# Patient Record
Sex: FEMALE | Race: WHITE | Employment: FULL TIME | URBAN - METROPOLITAN AREA
[De-identification: names, ages, dates, MRNs, and addresses within clinical notes are randomized per-mention and may not be internally consistent; named-entity substitution may affect disease eponyms.]

---

## 2022-05-03 ENCOUNTER — HOSPITAL ENCOUNTER (OUTPATIENT)
Dept: PHYSICAL THERAPY | Age: 67
Discharge: HOME | End: 2022-05-03
Payer: COMMERCIAL

## 2022-05-03 PROCEDURE — 97110 THERAPEUTIC EXERCISES: CPT

## 2022-05-03 PROCEDURE — 97162 PT EVAL MOD COMPLEX 30 MIN: CPT

## 2022-05-03 NOTE — PROGRESS NOTES
[address] [place] [address] PHYSICAL THERAPY AT [address] [place] [address] [address], [address] ,[address], [address]  Phone: [phone]  Fax: [phone] / [address] [address] PHYSICAL THERAPY SERVICES  Patient Name: Jennifer Macias : 1955   Medical   Diagnosis: Pain in right hip [M25.551] Treatment Diagnosis: R hip pain   Onset Date: 3-4 mos prior to evaluation      Referral Source: Hilda Zavala MD [facility]): 5/3/2022   Prior Hospitalization: See medical history Provider #: [id number]   Prior Level of Function: Pain-free in regards to R hip with driving or working with prolonged periods of standing/walking   Comorbidities: H/o R THR 2016, Clifford Prior TKR 2019, scheduled R TKR 22   Medications: Verified on Patient Summary List   The Plan of Care and following information is based on the information from the initial evaluation.   ==================================================================================  Assessment / key information:  Patient is a pleasant 77 y.o. female who presents to [facility] with R hip pain. Patient reports initial onset of sx 3-4 months ago which were of unknown etiology. Current c/o pain are intermittent & \"sharp\" in nature & worsen with activities such as sitting & driving (particularly with sitting on firmer surfaces are more difficult). Sx improve with standing & ambulation & use of heat & alleve/naproxen prn. Recent X-rays on 22 taken of R hip were unremarkable, no issues with previous hardware from R THR. Average reported pain level at 5/10, 9/10 at worst & 0/10 at best.  Upon objective evaluation, patient demonstrates R knee AROM as follows 8-90 degrees as compared to 2-120 degrees on L. Patient able to achieve ~ 3 degrees from full TKE after passive stretching today. L/s assessment was unremarkable today, mild reproduction of R hip pain with TERESO test today.   Reproduced c/o posterior thigh/knee pain with active knee flexion today. Moderate signs of antalgia with decreased step length on R & circumduction to clear R LE during swing phase of gait. MMT revealed weakness of R hip flexion 3/5 with c/o pain upon MMT. Patient can benefit PT interventions to improve ROM, decrease pain & improve gait mechanics to facilitate return to unlimited ADLs, work activities & overall functional status.   ==================================================================================  Eval Complexity: History HIGH Complexity :3+ comorbidities / personal factors will impact the outcome/ POC ;  Examination  MEDIUM Complexity : 3 Standardized tests and measures addressing body structure, function, activity limitation and / or participation in recreation ; Presentation MEDIUM Complexity : Evolving with changing characteristics ; Decision Making MEDIUM Complexity : FOTO score of 26-74; Overall Complexity MEDIUM  Problem List: pain affecting function, decrease ROM, decrease strength, edema affecting function, impaired gait/ balance, decrease ADL/ functional abilitiies, decrease activity tolerance, decrease flexibility/ joint mobility and decrease transfer abilities   Treatment Plan may include any combination of the following: Therapeutic exercise, Therapeutic activities, Neuromuscular re-education, Physical agent/modality, Gait/balance training, Manual therapy, Aquatic therapy, Patient education, Self Care training, Functional mobility training, Home safety training and Stair training  Patient / Family readiness to learn indicated by: asking questions, trying to perform skills and interest  Persons(s) to be included in education: patient (P)  Barriers to Learning/Limitations: None  Measures taken:    Patient Goal (s): \"relax muscles\"   Patient self reported health status: fair  Rehabilitation Potential: good   Short Term Goals:  To be accomplished in  1  weeks:  1) Establish HEP to prevent further disability. 2) Patient will be able to perform SLR on R without extensor lag indicating improved quad control in preparation for return to a normalized gait. 3) Patient will be able to maintain R SLS  for 10 seconds indicating improved use of balance strategy for safety with ambulation in challenging environments.  Long Term Goals: To be accomplished in  2  weeks:  1) Improve FOTO score from 62 points to > or = 69 points indicating improved tolerance with ADLs in regards to R hip.  2) Improved R hip flexion strength to 3/5 with no reproduction of pain to improve tolerance to sit to stand transfers from her car. 3) Patient will report improved standing tolerance to > or = 30 minutes at work with manageable sx in R LE. 4) Patient to be independent & compliant with HEP in preparation for D/C. Frequency / Duration:   Patient to be seen  2-3  times per week for 2 weeks:  Patient / Caregiver education and instruction: self care, activity modification, brace/ splint application and exercises    Therapist Signature: SIMBA Funes, thomas MDT Date: 9/[birth year]/1966   Certification Period: 5-03-22 to 8-02-22 Time: 12:53 PM   =================================================================================  I certify that the above Physical Therapy Services are being furnished while the patient is under my care. I agree with the treatment plan and certify that this therapy is necessary.     Physician Signature:                                                             Date:                                     Time:                                                                       Hema Narvaez MD

## 2022-05-03 NOTE — PROGRESS NOTES
PHYSICAL THERAPY - DAILY TREATMENT NOTE    Patient Name: Cortez Clark        Date: 5/3/2022  : 1955   YES Patient  Verified  Visit #:   1   of   6  Insurance: Payor: Tomy Guzman / Plan: [address] [place] [address] / Product Type: PPO /      In time: 12:55 P Out time: 1:35 P   Total Treatment Time: 40     BCBS/Medicare Time Tracking (below)   Total Timed Codes (min):  40 1:1 Treatment Time:  40     TREATMENT AREA = Pain in right hip [M25.551]    SUBJECTIVE  Pain Level (on 0 to 10 scale):  3  / 10   Medication Changes/New allergies or changes in medical history, any new surgeries or procedures?     NO    If yes, update Summary List   Subjective Functional Status/Changes:  []  No changes reported     See POC           Modalities Rationale:    Patient deferred     min [] Estim, type/location:                                      []  att     []  unatt     []  w/US     []  w/ice    []  w/heat    min []  Mechanical Traction: type/lbs                   []  pro   []  sup   []  int   []  cont    []  before manual    []  after manual    min []  Ultrasound, settings/location:      min []  Iontophoresis w/ dexamethasone, location:                                               []  take home patch       []  in clinic    min []  Ice     []  Heat    location/position:     min []  Vasopneumatic Device, press/temp:    If using vaso (only need to measure limb vaso being performed on)      pre-treatment girth :       post-treatment girth :       measured at (landmark location) :      min []  Other:    [] Skin assessment post-treatment (if applicable):    []  intact    []  redness- no adverse reaction                  []redness - adverse reaction:        10 min Therapeutic Exercise:  [x]  See flow sheet   Rationale:      increase ROM and increase strength to improve the patients ability to return to pain-free ambulation     Billed With/As:   [] TE   [] TA   [] Neuro   [] Self Care Patient Education: [x] Review HEP    [] Progressed/Changed HEP based on:   [] positioning   [] body mechanics   [] transfers   [] heat/ice application    [] other:      Other Objective/Functional Measures:    See POC     Post Treatment Pain Level (on 0 to 10) scale:   3  / 10     ASSESSMENT  Assessment/Changes in Function:     See POC      []  See Progress Note/Recertification   Patient will continue to benefit from skilled PT services to modify and progress therapeutic interventions, address functional mobility deficits, address ROM deficits, address strength deficits, analyze and address soft tissue restrictions, analyze and cue movement patterns, analyze and modify body mechanics/ergonomics, assess and modify postural abnormalities, address imbalance/dizziness and instruct in home and community integration to attain remaining goals.    Progress toward goals / Updated goals:    Progressing towards goals established at [address] [facility] [address]  []  Upgrade activities as tolerated YES Continue plan of care   []  Discharge due to :    []  Other:      Therapist: Abida Lancaster, PT    Date: 5/3/2022 Time: 2:00 PM     Future Appointments   Date Time Provider Rhett Parra   5/5/2022  8:00 AM Julia Baeza, PT [facility] [place] [facility]   5/10/2022  4:30 PM Julia Baeza PT [facility]   5/12/2022  5:00 PM Julia Baeza, PT [facility]   5/16/2022  9:30 AM Mena Madden, PT [facility]

## 2022-05-05 ENCOUNTER — HOSPITAL ENCOUNTER (OUTPATIENT)
Dept: PHYSICAL THERAPY | Age: 67
Discharge: HOME | End: 2022-05-05
Payer: COMMERCIAL

## 2022-05-05 PROCEDURE — 97530 THERAPEUTIC ACTIVITIES: CPT

## 2022-05-05 PROCEDURE — 97110 THERAPEUTIC EXERCISES: CPT

## 2022-05-05 NOTE — PROGRESS NOTES
PHYSICAL THERAPY - DAILY TREATMENT NOTE    Patient Name: Debra Carrillo        Date: 2022  : 1955   YES Patient  Verified  Visit #:   2   of   6  Insurance: Payor: Hannah Barraza / Plan: [place] MEDICARE PART A & B / Product Type: Medicare /      In time: 8 A Out time: 9:05 A   Total Treatment Time: 55     BCBS/Medicare Time Tracking (below)   Total Timed Codes (min):  45 1:1 Treatment Time:  45     TREATMENT AREA =  Pain in right hip [M25.551]    SUBJECTIVE  Pain Level (on 0 to 10 scale):  2  / 10   Medication Changes/New allergies or changes in medical history, any new surgeries or procedures? NO    If yes, update Summary List   Subjective Functional Status/Changes:  []  No changes reported     Patient reports her hip is not hurting too much this morning.         Modalities Rationale:     decrease edema, decrease inflammation and decrease pain to improve patient's ability to return to pain-free standing at work    min [] Estim, type/location:                                      []  att     []  unatt     []  w/US     []  w/ice    []  w/heat    min []  Mechanical Traction: type/lbs                   []  pro   []  sup   []  int   []  cont    []  before manual    []  after manual    min []  Ultrasound, settings/location:      min []  Iontophoresis w/ dexamethasone, location:                                               []  take home patch       []  in clinic   10 min [x]  Ice     []  Heat    location/position: Supine to R hip     min []  Vasopneumatic Device, press/temp:    If using vaso (only need to measure limb vaso being performed on)      pre-treatment girth :       post-treatment girth :       measured at (landmark location) :      min []  Other:    [] Skin assessment post-treatment (if applicable):    [x]  intact    [x]  redness- no adverse reaction                  _redness - adverse reaction:        30 min Therapeutic Exercise:  [x]  See flow sheet   Rationale:      increase ROM and increase strength to improve the patients ability to return to upright standing without increase in hip pain     15 min Neuromuscular Re-ed: _  See flow sheet   Rationale:    increase ROM, increase strength, improve coordination, improve balance and increase proprioception to improve the patients ability to return to standing/ambulation with upright posture      Billed With/As:   [] TE   [] TA   [] Neuro   [] Self Care Patient Education: [x] Review HEP    [] Progressed/Changed HEP based on:   [] positioning   [] body mechanics   [] transfers   [] heat/ice application    [] other:      Other Objective/Functional Measures:    Initiated exercises per flow sheet (see updated HEP)     Post Treatment Pain Level (on 0 to 10) scale:   1  / 10     ASSESSMENT  Assessment/Changes in Function:     Good tolerance to initial program, limited R knee flexion mild increase in pain with H/L exercises     []  See Progress Note/Recertification   Patient will continue to benefit from skilled PT services to modify and progress therapeutic interventions, address functional mobility deficits, address ROM deficits, address strength deficits, analyze and address soft tissue restrictions, analyze and cue movement patterns, analyze and modify body mechanics/ergonomics, assess and modify postural abnormalities, address imbalance/dizziness and instruct in home and community integration to attain remaining goals.    Progress toward goals / Updated goals:    Progressing towards STG 2 & STG 1 met      PLAN  []  Upgrade activities as tolerated YES Continue plan of care   []  Discharge due to :    []  Other:      Therapist: Aristeo Jiang, PT    Date: 5/5/2022 Time: 12:43 PM     Future Appointments   Date Time Provider Rhett Parra   5/10/2022  4:30 PM Micha Wyatt [facility]   5/12/2022  5:00 PM Davis Chavira, PT [facility]   5/16/2022  9:30 AM Ger Madden, PT [facility]

## 2022-05-10 ENCOUNTER — HOSPITAL ENCOUNTER (OUTPATIENT)
Dept: PHYSICAL THERAPY | Age: 67
Discharge: HOME | End: 2022-05-10
Payer: COMMERCIAL

## 2022-05-10 PROCEDURE — 97110 THERAPEUTIC EXERCISES: CPT

## 2022-05-10 PROCEDURE — 97530 THERAPEUTIC ACTIVITIES: CPT

## 2022-05-10 PROCEDURE — 97112 NEUROMUSCULAR REEDUCATION: CPT

## 2022-05-10 NOTE — PROGRESS NOTES
PHYSICAL THERAPY - DAILY TREATMENT NOTE    Patient Name: Alvin Cooley        Date: 5/10/2022  : 1955   YES Patient  Verified  Visit #:   3   of   6  Insurance: Payor: [employer]  / Plan: [place] MEDICARE PART A & B / Product Type: Medicare /      In time: 4:40 P Out time: 5:30 P   Total Treatment Time: 50     BCBS/Medicare Time Tracking (below)   Total Timed Codes (min):  40 1:1 Treatment Time:  40     TREATMENT AREA =  Pain in right hip [M25.551]  SUBJECTIVE  Pain Level (on 0 to 10 scale):  0  / 10   Medication Changes/New allergies or changes in medical history, any new surgeries or procedures? NO    If yes, update Summary List   Subjective Functional Status/Changes:  []  No changes reported     Patient reports she is feeling good today, she had some soreness in her back after last PT.             Modalities Rationale:     decrease edema, decrease inflammation and decrease pain to improve patient's ability to return to pain-free work activities   min [] Estim, type/location:                                      []  att     []  unatt     []  w/US     []  w/ice    []  w/heat    min []  Mechanical Traction: type/lbs                   []  pro   []  sup   []  int   []  cont    []  before manual    []  after manual    min []  Ultrasound, settings/location:      min []  Iontophoresis w/ dexamethasone, location:                                               []  take home patch       []  in clinic   10 min [x]  Ice     []  Heat    location/position: Supine to R hip     min []  Vasopneumatic Device, press/temp:    If using vaso (only need to measure limb vaso being performed on)      pre-treatment girth :       post-treatment girth :       measured at (landmark location) :      min []  Other:    [] Skin assessment post-treatment (if applicable):    [x]  intact    [x]  redness- no adverse reaction                  []redness - adverse reaction:        20 min Therapeutic Exercise:  [x]  See flow sheet   Rationale: increase ROM and increase strength to improve the patients ability to return to pain-free walking     10 min Neuromuscular Re-ed: [x]  See flow sheet   Rationale:    increase ROM, increase strength, improve balance and increase proprioception to improve the patients ability to return to ambulation with improved gait mechanics    10 min Therapeutic Activity: [x]  See flow sheet   Rationale:    improve coordination, improve balance and increase proprioception to improve the patients ability to return to symptom free squatting     Billed With/As:   [] TE   [] TA   [] Neuro   [] Self Care Patient Education: [x] Review HEP    [] Progressed/Changed HEP based on:   [] positioning   [] body mechanics   [] transfers   [] heat/ice application    [] other:      Other Objective/Functional Measures: Added step ups, SLS & mini squats today     Post Treatment Pain Level (on 0 to 10) scale:   0  / 10     ASSESSMENT  Assessment/Changes in Function:     Visual cues with mirror for = weightbearing with mini squats & to avoid excessive trunk flexion to control depth of squat, reviewed squatting with sit to stand transfers     []  See Progress Note/Recertification   Patient will continue to benefit from skilled PT services to modify and progress therapeutic interventions, address functional mobility deficits, address ROM deficits, address strength deficits, analyze and address soft tissue restrictions, analyze and cue movement patterns, analyze and modify body mechanics/ergonomics, assess and modify postural abnormalities, address imbalance/dizziness and instruct in home and community integration to attain remaining goals.    Progress toward goals / Updated goals:    Progressing towards STG 3 & STG 2 met     PLAN  []  Upgrade activities as tolerated YES Continue plan of care   []  Discharge due to :    []  Other:      Therapist: Fab Barillas PT    Date: 5/10/2022 Time: 6:41 PM     Future Appointments   Date Time Provider Rhett Parra   5/12/2022  5:00 PM Nilsa Quevedo, PT [facility]   5/16/2022  9:30 AM Monica Madden, PT [facility]

## 2022-05-12 ENCOUNTER — HOSPITAL ENCOUNTER (OUTPATIENT)
Dept: PHYSICAL THERAPY | Age: 67
Discharge: HOME | End: 2022-05-12
Payer: COMMERCIAL

## 2022-05-12 PROCEDURE — 97112 NEUROMUSCULAR REEDUCATION: CPT

## 2022-05-12 PROCEDURE — 97530 THERAPEUTIC ACTIVITIES: CPT

## 2022-05-12 PROCEDURE — 97110 THERAPEUTIC EXERCISES: CPT

## 2022-05-12 NOTE — PROGRESS NOTES
PHYSICAL THERAPY - DAILY TREATMENT NOTE    Patient Name: Cortez Clark        Date: 2022  : 1955   YES Patient  Verified  Visit #:   4   of   6  Insurance: Payor: Teofilo Sheikh / Plan: [place] MEDICARE PART A & B / Product Type: Medicare /      In time: 5:05 P Out time: 6:10 P   Total Treatment Time: 55     BCBS/Medicare Time Tracking (below)   Total Timed Codes (min):  45 1:1 Treatment Time:  45     TREATMENT AREA =  Pain in right hip [M25.551]  SUBJECTIVE  Pain Level (on 0 to 10 scale):  3- 10   Medication Changes/New allergies or changes in medical history, any new surgeries or procedures? NO    If yes, update Summary List   Subjective Functional Status/Changes:  []  No changes reported     Patient reports she has just come off of work & she is sore today, she has some pain in her R knee, hip & lower back.            Modalities Rationale:     decrease edema, decrease inflammation and decrease pain to improve patient's ability to return to pain-free work activities   min [] Estim, type/location:                                      []  att     []  unatt     []  w/US     []  w/ice    []  w/heat    min []  Mechanical Traction: type/lbs                   []  pro   []  sup   []  int   []  cont    []  before manual    []  after manual    min []  Ultrasound, settings/location:      min []  Iontophoresis w/ dexamethasone, location:                                               []  take home patch       []  in clinic   10 min [x]  Ice     []  Heat    location/position: Supine to R hip     min []  Vasopneumatic Device, press/temp:    If using vaso (only need to measure limb vaso being performed on)      pre-treatment girth :       post-treatment girth :       measured at (landmark location) :      min []  Other:    [] Skin assessment post-treatment (if applicable):    [x]  intact    [x]  redness- no adverse reaction                  []redness - adverse reaction:        25 min Therapeutic Exercise:  [x] See flow sheet   Rationale:      increase ROM and increase strength to improve the patients ability to return to pain-free standing at work    10 min Neuromuscular Re-ed: [x]  See flow sheet   Rationale:    increase ROM, increase strength, improve balance and increase proprioception to improve the patients ability to return to ambulation with improved gait mechanics with decreased extensor lag    10 min Therapeutic Activity: [x]  See flow sheet   Rationale:    improve coordination, improve balance and increase proprioception to improve the patients ability to return to symptom free lifting at work     Billed With/As:   [] TE   [] TA   [] Neuro   [] Self Care Patient Education: [x] Review HEP    [] Progressed/Changed HEP based on:   [] positioning   [] body mechanics   [] transfers   [] heat/ice application    [] other:      Other Objective/Functional Measures: Added posterior hip stretch in supine     Post Treatment Pain Level (on 0 to 10) scale:   2  / 10     ASSESSMENT  Assessment/Changes in Function:     Decreased stability as well as mild c/o R knee pain with single leg stance today      []  See Progress Note/Recertification   Patient will continue to benefit from skilled PT services to modify and progress therapeutic interventions, address functional mobility deficits, address ROM deficits, address strength deficits, analyze and address soft tissue restrictions, analyze and cue movement patterns, analyze and modify body mechanics/ergonomics, assess and modify postural abnormalities, address imbalance/dizziness and instruct in home and community integration to attain remaining goals.    Progress toward goals / Updated goals:    Progressing towards STG 3     PLAN  []  Upgrade activities as tolerated YES Continue plan of care   []  Discharge due to :    []  Other:      Therapist: Joesph Fernandes PT    Date: 5/12/2022 Time: 6:45 PM     Future Appointments   Date Time Provider Rhett Parra   5/16/2022 9:30 AM Km Madden Dk, PT [facility]

## 2022-05-16 ENCOUNTER — HOSPITAL ENCOUNTER (OUTPATIENT)
Dept: PHYSICAL THERAPY | Age: 67
Discharge: HOME | End: 2022-05-16
Payer: COMMERCIAL

## 2022-05-16 PROCEDURE — 97530 THERAPEUTIC ACTIVITIES: CPT

## 2022-05-16 PROCEDURE — 97110 THERAPEUTIC EXERCISES: CPT

## 2022-05-16 NOTE — PROGRESS NOTES
PHYSICAL THERAPY - DAILY TREATMENT NOTE    Patient Name: Kalpana Cutler        Date: 2022  : 1955   YES Patient  Verified  Visit #:   5   of   5  Insurance: Payor: Jeanette Lax / Plan: [place] MEDICARE PART A & B / Product Type: Medicare /      In time: 9:40 A Out time: 10:45 A   Total Treatment Time: 55     BCBS/Medicare Time Tracking (below)   Total Timed Codes (min):  45 1:1 Treatment Time:  45     TREATMENT AREA =  Pain in right hip [M25.551]  SUBJECTIVE  Pain Level (on 0 to 10 scale):  0.5  / 10   Medication Changes/New allergies or changes in medical history, any new surgeries or procedures?     NO    If yes, update Summary List   Subjective Functional Status/Changes:  []  No changes reported     See DC summary            Modalities Rationale:     decrease edema, decrease inflammation and decrease pain to improve patient's ability to return to pain-free ADLs    min [] Estim, type/location:                                      []  att     []  unatt     []  w/US     []  w/ice    []  w/heat    min []  Mechanical Traction: type/lbs                   []  pro   []  sup   []  int   []  cont    []  before manual    []  after manual    min []  Ultrasound, settings/location:      min []  Iontophoresis w/ dexamethasone, location:                                               []  take home patch       []  in clinic   10 min [x]  Ice     []  Heat    location/position: Supine to R hip/knee    min []  Vasopneumatic Device, press/temp:    If using vaso (only need to measure limb vaso being performed on)      pre-treatment girth :       post-treatment girth :       measured at (landmark location) :      min []  Other:    [] Skin assessment post-treatment (if applicable):    [x]  intact    [x]  redness- no adverse reaction                  []redness - adverse reaction:        30 min Therapeutic Exercise:  [x]  See flow sheet   Rationale:      increase ROM and increase strength to improve the patients ability to return to pain-free standing     15 min Therapeutic Activity: [x]  See flow sheet   Rationale:    increase ROM, increase strength, improve balance and increase proprioception to improve the patients ability to return to pain-free walking program     Billed With/As:   [] TE   [] TA   [] Neuro   [] Self Care Patient Education: [x] Review HEP    [] Progressed/Changed HEP based on:   [] positioning   [] body mechanics   [] transfers   [] heat/ice application    [] other:      Other Objective/Functional Measures:    FOTO 64 points  MMT: R hip flexion 4/4  R knee AROM 3-85 degrees      Post Treatment Pain Level (on 0 to 10) scale:   0  / 10     ASSESSMENT  Assessment/Changes in Function:     Patient is pleased with her progress & feels ready for DC to [facility]      []  See Progress Note/Recertification   Patient will continue to benefit from skilled PT services to instruct in home and community integration to attain remaining goals. Progress toward goals / Updated goals:    See DC summary for progress with goals      PLAN  []  Upgrade activities as tolerated NO Continue plan of care   []  Discharge due to :    []  Other:      Therapist: Dafen Ellington, PT    Date: 5/16/2022 Time: 9:40 AM     No future appointments.

## 2022-05-16 NOTE — PROGRESS NOTES
[address] [place] [address] PHYSICAL THERAPY AT [address] [place], [place] [address]fallon [address], [address] ,[address], [address]  Phone: [phone]  Fax: [phone] FOR PHYSICAL THERAPY          Patient Name: Mac Jones : 1955   Treatment/Medical Diagnosis: Pain in right hip [M25.551]   Onset Date: 3-4 mos prior to evaluation     Referral Source: Ayana Carty MD [facility]): 22   Prior Hospitalization: See Medical History Provider #: [id number]   Prior Level of Function: Pain-free in regards to R hip with driving or working with prolonged periods of standing/walking   Comorbidities: H/o R THR 2016, Jennifer Sachs TKR 2019, scheduled R TKR 22   Medications: Verified on Patient Summary List   [facility] 5 Missed Visits: 0     Goal/Measure of Progress Goal Met? 1. Patient will be able to perform SLR on R without extensor lag indicating improved quad control in preparation for return to a normalized gait. Status at last Eval: Extensor lag Current Status: No extensor lag with SLR (pt able to maintain quad set within available ROM) yes   2 Patient will be able to maintain R SLS  for 10 seconds indicating improved use of balance strategy for safety with ambulation in challenging environments. Status at last Eval: unable Current Status: 10 secs yes   3. Improved R hip flexion strength to 3/5 with no reproduction of pain to improve tolerance to sit to stand transfers from her car. Status at last Eval: 3/5 c/o pain upon MMT Current Status: 4/5  yes   4. Patient will report improved standing tolerance to > or = 30 minutes at work with manageable sx in R LE. Status at last Eval: Limited by pain Current Status: 1 hour yes     Key Functional Changes/Progress: Mrs. Marita Chi has made good progress with reduction of R hip pain, while extension of R knee has improved, lacking 2-3 degrees from TKE, R knee flexion continues to be limited at 85 degrees.  She has reported improved tolerance to prolonged standing & ambulation at work. She has improved gait mechanics with improved R knee extension prior to initial contact. She is scheduled for TKR on 5-17-22. She is pleased with her progress & is in agreement with DC. Assessments/Recommendations: Discontinue therapy. Progressing towards or have reached established goals. If you have any questions/comments please contact us directly at [phone]. Thank you for allowing us to assist in the care of your patient.     Therapist Signature: SIMBA Fang, cert MDT Date: 3-81-57   Reporting Period: 5-03-22 to  Time: 10:22 AM

## 2022-06-23 ENCOUNTER — HOSPITAL ENCOUNTER (OUTPATIENT)
Dept: PHYSICAL THERAPY | Age: 67
Discharge: HOME | End: 2022-06-23
Payer: COMMERCIAL

## 2022-06-23 PROCEDURE — 97110 THERAPEUTIC EXERCISES: CPT

## 2022-06-23 PROCEDURE — 97162 PT EVAL MOD COMPLEX 30 MIN: CPT

## 2022-06-23 NOTE — PROGRESS NOTES
PHYSICAL THERAPY - DAILY TREATMENT NOTE    Patient Name: Yessenia Robison        Date: 2022  : 1955   YES Patient  Verified  Visit #:     Insurance: Payor: Karolina Rey / Plan: [place] MEDICARE PART A & B / Product Type: Medicare /      In time: 4:25 P Out time: 5:10 P   Total Treatment Time: 45     BCBS/Medicare Time Tracking (below)   Total Timed Codes (min):  35 1:1 Treatment Time:  35     TREATMENT AREA =  Pain in right knee [M25.561]  SUBJECTIVE  Pain Level (on 0 to 10 scale):  0  / 10   Medication Changes/New allergies or changes in medical history, any new surgeries or procedures?     NO    If yes, update Summary List   Subjective Functional Status/Changes:  []  No changes reported     See POC            Modalities Rationale:     decrease edema, decrease inflammation and decrease pain to improve patient's ability to return to pain-free ADLs    min [] Estim, type/location:                                      []  att     []  unatt     []  w/US     []  w/ice    []  w/heat    min []  Mechanical Traction: type/lbs                   []  pro   []  sup   []  int   []  cont    []  before manual    []  after manual    min []  Ultrasound, settings/location:      min []  Iontophoresis w/ dexamethasone, location:                                               []  take home patch       []  in clinic   10 min [x]  Ice     []  Heat    location/position: Supine to R knee     min []  Vasopneumatic Device, press/temp:    If using vaso (only need to measure limb vaso being performed on)      pre-treatment girth :       post-treatment girth :       measured at (landmark location) :      min []  Other:    [] Skin assessment post-treatment (if applicable):    [x]  intact    [x]  redness- no adverse reaction                  []redness - adverse reaction:        10 min Therapeutic Exercise:  [x]  See flow sheet   Rationale:      increase ROM and increase strength to improve the patients ability to return to pain-free walking program     Billed With/As:   [] TE   [] TA   [] Neuro   [] Self Care Patient Education: [x] Review HEP    [] Progressed/Changed HEP based on:   [] positioning   [] body mechanics   [] transfers   [] heat/ice application    [] other:      Other Objective/Functional Measures:    See POC     Post Treatment Pain Level (on 0 to 10) scale:   0  / 10     ASSESSMENT  Assessment/Changes in Function:     See POC      []  See Progress Note/Recertification   Patient will continue to benefit from skilled PT services to modify and progress therapeutic interventions, address functional mobility deficits, address ROM deficits, address strength deficits, analyze and address soft tissue restrictions, analyze and cue movement patterns, analyze and modify body mechanics/ergonomics, assess and modify postural abnormalities, address imbalance/dizziness and instruct in home and community integration to attain remaining goals.    Progress toward goals / Updated goals:    Progressing towards goal established at [address]  []  Upgrade activities as tolerated YES Continue plan of care   []  Discharge due to :    []  Other:      Therapist: Pretty Aguilar, PT    Date: 6/23/2022 Time: 4:57 PM     Future Appointments   Date Time Provider Rhett Parra   6/27/2022  7:15 AM Latha Madden, PT [facility]

## 2022-06-23 NOTE — PROGRESS NOTES
[address] [place] [address] PHYSICAL THERAPY AT [address] [place] [address]  [address], [address] ,[address], [address]  Phone: [phone]  Fax: [phone] / [address] [address] PHYSICAL THERAPY SERVICES  Patient Name: Manny Schulz : 1955   Medical   Diagnosis: Pain in right knee [M25.561] Treatment Diagnosis: S/p R TKR    Onset Date: 22     Referral Source: Erick Matthews MD Start of Care [facility]): 2022   Prior Hospitalization: See medical history Provider #: [id number]   Prior Level of Function: C/o R hip pain & limited with standing/walking with ADLs & work   Comorbidities: H/o R THR, L TKR, HTN, depression   Medications: Verified on Patient Summary List   The Plan of Care and following information is based on the information from the initial evaluation.   ==================================================================================  Assessment / key information:  Patient is a pleasant 77 y.o. female who presents to [facility] s/p R TKR (DOS 22). Patient reports she stayed at the hospital for 1 night after surgery & was DC from home health PT on 22. She DC use of RW & SPC ~3 weeks ago. Current c/o pain are intermittent in nature & worsen with activities such as prolonged upright activities & as the day progresses. Sx improve with use of tylenol prn, she is on her 3rd day of a steroid taper that she was prescribed this past follow up with MD.  Average reported pain level at 0.5/10, 2/10 at worst & 0/10 at best.  Upon objective evaluation, patient demonstrates R knee AROM as follows 7-103 degrees as compared to 2-125 degrees in supine. MMT revealed  Overall R knee strength at 4/5 with minimal to no c/o pain upon MMT. She is currently ambulating without AD, extensor lag on R & decreased step length. Moderate warm to palpation, mild signs of swelling, scar is intact & appears to be healing well.    Patient can benefit PT interventions to improve ROM, decrease pain & swelling & normalize gait to facilitate return to unlimited ADLs, work activities & overall functional status.   ==================================================================================  Eval Complexity: History HIGH Complexity :3+ comorbidities / personal factors will impact the outcome/ POC ;  Examination  MEDIUM Complexity : 3 Standardized tests and measures addressing body structure, function, activity limitation and / or participation in recreation ; Presentation MEDIUM Complexity : Evolving with changing characteristics ; Decision Making MEDIUM Complexity : FOTO score of 26-74; Overall Complexity MEDIUM  Problem List: pain affecting function, decrease ROM, decrease strength, edema affecting function, impaired gait/ balance, decrease ADL/ functional abilitiies, decrease activity tolerance, decrease flexibility/ joint mobility and decrease transfer abilities   Treatment Plan may include any combination of the following: Therapeutic exercise, Therapeutic activities, Neuromuscular re-education, Physical agent/modality, Gait/balance training, Manual therapy, Aquatic therapy, Patient education, Self Care training, Functional mobility training, Home safety training and Stair training  Patient / Family readiness to learn indicated by: asking questions, trying to perform skills and interest  Persons(s) to be included in education: patient (P)  Barriers to Learning/Limitations: None  Measures taken:    Patient Goal (s): \"increase ROM, strengthen muscles\"   Patient self reported health status: fair  Rehabilitation Potential: good   Short Term Goals: To be accomplished in  2  weeks:  1) Establish HEP to prevent further disability. 2) Patient will report decreased c/o pain to < or = 0-1/10 at worst to facilitate sleeping at night uninterrupted with manageable sx in R knee.   3) Improve FOTO score from 58 points to > or = 60 points indicating improved tolerance with ADLs in regards to R knee. 4) Patient will improve R knee AROM to 2-110 degrees so ROM is available for dressing activities.  Long Term Goals: To be accomplished in  4  weeks:  1) Improve FOTO score from 60 points to > or = 63 points indicating improved tolerance with ADLs in regards to R knee. 2) Improve overall strength of R knee to 5-/5 with no c/o pain upon MMT so strength is available for return to work at full duty. 3) Patient to be able to perform FWD step down from 4-6 inch step with good pelvis/knee stability & no c/o pain in preparation for return to rec stair negotiation. 4) Patient will ambulate with a normalized gait pattern without assistive device with no signs of extensor lag on R. Frequency / Duration:   Patient to be seen  2-3  times per week for 4  weeks:  Patient / Caregiver education and instruction: self care, activity modification, brace/ splint application and exercises    Therapist Signature: SIMBA Desouza, thomas MDT Date: 3/36/9651   Certification Period: 6-23-22 to 9-21-22 Time: 4:30 PM   =================================================================================  I certify that the above Physical Therapy Services are being furnished while the patient is under my care. I agree with the treatment plan and certify that this therapy is necessary.     Physician Signature:                                                             Date:                                     Time:                                                                       Ken Allred MD

## 2022-06-27 ENCOUNTER — HOSPITAL ENCOUNTER (OUTPATIENT)
Dept: PHYSICAL THERAPY | Age: 67
Discharge: HOME | End: 2022-06-27
Payer: COMMERCIAL

## 2022-06-27 PROCEDURE — 97530 THERAPEUTIC ACTIVITIES: CPT

## 2022-06-27 PROCEDURE — 97110 THERAPEUTIC EXERCISES: CPT

## 2022-06-27 NOTE — PROGRESS NOTES
PHYSICAL THERAPY - DAILY TREATMENT NOTE    Patient Name: Paulette Pete        Date: 2022  : 1955   YES Patient  Verified  Visit #:   2   of   12  Insurance: Payor: Charlotte Martinez / Plan: VA MEDICARE PART A & B / Product Type: Medicare /      In time: 7:20 A Out time: 8:20 A   Total Treatment Time: 55     BCBS/Medicare Time Tracking (below)   Total Timed Codes (min):  45 1:1 Treatment Time:  40     TREATMENT AREA =  Pain in right knee [M25.561]  SUBJECTIVE  Pain Level (on 0 to 10 scale):  0  / 10   Medication Changes/New allergies or changes in medical history, any new surgeries or procedures? NO    If yes, update Summary List   Subjective Functional Status/Changes:  []  No changes reported     Patient reports she has stiffness in her knee, some pain on the back of her thigh.             Modalities Rationale:     decrease edema, decrease inflammation and decrease pain to improve patient's ability to return to pain-free ADLs    min [] Estim, type/location:                                      []  att     []  unatt     []  w/US     []  w/ice    []  w/heat    min []  Mechanical Traction: type/lbs                   []  pro   []  sup   []  int   []  cont    []  before manual    []  after manual    min []  Ultrasound, settings/location:      min []  Iontophoresis w/ dexamethasone, location:                                               []  take home patch       []  in clinic   10 min [x]  Ice     []  Heat    location/position: Supine to R knee     min []  Vasopneumatic Device, press/temp:    If using vaso (only need to measure limb vaso being performed on)      pre-treatment girth :       post-treatment girth :       measured at (landmark location) :      min []  Other:    [] Skin assessment post-treatment (if applicable):    [x]  intact    [x]  redness- no adverse reaction                  []redness - adverse reaction:        30/  25 min Therapeutic Exercise:  [x]  See flow sheet   Rationale: increase ROM, increase strength, improve balance and increase proprioception to improve the patients ability to return to ambulation with decreased signed antalgia      15 min Therapeutic Activity: [x]  See flow sheet   Rationale:    increase ROM, increase strength, improve balance and increase proprioception to improve the patients ability to return to pain-free standing      Billed With/As:   [] TE   [] TA   [] Neuro   [] Self Care Patient Education: [x] Review HEP    [] Progressed/Changed HEP based on:   [] positioning   [] body mechanics   [] transfers   [] heat/ice application    [] other:      Other Objective/Functional Measures:    Initiated exercises per flow sheet (see updated HEP)     Post Treatment Pain Level (on 0 to 10) scale:   1  / 10     ASSESSMENT  Assessment/Changes in Function:     Good tolerance to initial treatment, mild posterior knee pain with SLR as well as flexion self stretches     []  See Progress Note/Recertification   Patient will continue to benefit from skilled PT services to modify and progress therapeutic interventions, address functional mobility deficits, address ROM deficits, address strength deficits, analyze and address soft tissue restrictions, analyze and cue movement patterns, analyze and modify body mechanics/ergonomics, assess and modify postural abnormalities, address imbalance/dizziness and instruct in home and community integration to attain remaining goals.    Progress toward goals / Updated goals:    Progressing towards goals STG & STG 1 met      PLAN  []  Upgrade activities as tolerated YES Continue plan of care   []  Discharge due to :    []  Other:      Therapist: Olivia Jacinto PT    Date: 6/27/2022 Time: 7:45 AM     Future Appointments   Date Time Provider Rhett Parra   6/30/2022  7:15 AM Rosendo Clubs, PT [facility]   7/5/2022  7:15 AM Rosendo Clubs, PT [facility]   7/8/2022  7:15 AM Rosendo Clubs, PT [facility]   7/11/2022  7:15 AM Charlie Lupe [facility]   7/15/2022  7:15 AM Ivy Maguire, PT [facility]   7/18/2022  7:15 AM Ivy Maguire, PT [facility]   7/22/2022  7:15 AM vIy Maguire, PT [facility]   7/25/2022  7:15 AM Ivy Maguire, PT [facility]   7/29/2022  7:15 AM Sohan Madden, PT [facility]

## 2022-06-30 ENCOUNTER — HOSPITAL ENCOUNTER (OUTPATIENT)
Dept: PHYSICAL THERAPY | Age: 67
Discharge: HOME | End: 2022-06-30
Payer: COMMERCIAL

## 2022-06-30 PROCEDURE — 97530 THERAPEUTIC ACTIVITIES: CPT

## 2022-06-30 PROCEDURE — 97110 THERAPEUTIC EXERCISES: CPT

## 2022-06-30 PROCEDURE — 97112 NEUROMUSCULAR REEDUCATION: CPT

## 2022-06-30 NOTE — PROGRESS NOTES
PHYSICAL THERAPY - DAILY TREATMENT NOTE    Patient Name: Nadeem Rosa        Date: 2022  : 1955   YES Patient  Verified  Visit #:   3   of   12  Insurance: Payor: Gerry Seeds / Plan: VA MEDICARE PART A & B / Product Type: Medicare /      In time: 7:15 A Out time: 8:15 A   Total Treatment Time: 55     BCBS/Medicare Time Tracking (below)   Total Timed Codes (min):  45 1:1 Treatment Time:  45     TREATMENT AREA =  Pain in right knee [M25.561]  SUBJECTIVE  Pain Level (on 0 to 10 scale):    / 10   Medication Changes/New allergies or changes in medical history, any new surgeries or procedures? NO    If yes, update Summary List   Subjective Functional Status/Changes:  []  No changes reported     Patient reports she was sore after PT & the day after she took an OTC med to manage her pain. She was able to get around all the way on her recumbent bike at home after a few tries.            Modalities Rationale:     decrease edema, decrease inflammation and decrease pain to improve patient's ability to return to pain-free ADLs    min [] Estim, type/location:                                      []  att     []  unatt     []  w/US     []  w/ice    []  w/heat    min []  Mechanical Traction: type/lbs                   []  pro   []  sup   []  int   []  cont    []  before manual    []  after manual    min []  Ultrasound, settings/location:      min []  Iontophoresis w/ dexamethasone, location:                                               []  take home patch       []  in clinic   10 min [x]  Ice     []  Heat    location/position: Supine to R knee     min []  Vasopneumatic Device, press/temp:    If using vaso (only need to measure limb vaso being performed on)      pre-treatment girth :       post-treatment girth :       measured at (landmark location) :      min []  Other:    [] Skin assessment post-treatment (if applicable):    [x]  intact    [x]  redness- no adverse reaction                  []redness - adverse reaction:        15 min Therapeutic Exercise:  [x]  See flow sheet   Rationale:      increase ROM and increase strength to improve the patients ability to return to pain-free standing     15 min Therapeutic Activity: [x]  See flow sheet   Rationale:    increase ROM, increase strength, improve balance and increase proprioception to improve the patients ability to return to pain-free stair negotiation     15 min Neuromuscular Re-ed: [x]  See flow sheet   Rationale:    increase ROM, increase strength, improve coordination, improve balance and increase proprioception to improve the patients ability to improve gait mechanics with decreased extensor lag      Billed With/As:   [] TE   [] TA   [] Neuro   [] Self Care Patient Education: [x] Review HEP    [] Progressed/Changed HEP based on:   [] positioning   [] body mechanics   [] transfers   [] heat/ice application    [] other:      Other Objective/Functional Measures: Added step up on 4 inch step, performed bike at end of treatment prior to heel slides in supine      Post Treatment Pain Level (on 0 to 10) scale:   4  / 10     ASSESSMENT  Assessment/Changes in Function:     Good tolerance to today's treatment improved TKE after initial stretching      []  See Progress Note/Recertification   Patient will continue to benefit from skilled PT services to modify and progress therapeutic interventions, address functional mobility deficits, address ROM deficits, address strength deficits, analyze and address soft tissue restrictions, analyze and cue movement patterns, analyze and modify body mechanics/ergonomics, assess and modify postural abnormalities, address imbalance/dizziness and instruct in home and community integration to attain remaining goals.    Progress toward goals / Updated goals:    Progressing towards STG 2 & 4     PLAN  []  Upgrade activities as tolerated YES Continue plan of care   []  Discharge due to :    []  Other:      Therapist: Jen Holland REYMUNDO, PT    Date: 6/30/2022 Time: 7:35 AM     Future Appointments   Date Time Provider Rhett Mcdonnelli   7/5/2022  7:15 AM Fernando Greenbrier, PT [facility] [place] [address]   7/8/2022  7:15 AM Fernando Greenbrier, PT [facility] [address]   7/11/2022  7:15 AM Fernando Greenbrier, PT [facility] [address]   7/15/2022  7:15 AM Fernando Greenbrier, PT [facility] [address]   7/18/2022  7:15 AM Fernando Greenbrier, PT [facility] [address]   7/22/2022  7:15 AM Fernando Greenbrier, PT [facility] [address]   7/25/2022  7:15 AM Fernando Greenbrier, PT [facility] [address]   7/29/2022  7:15 AM Akash Madden, PT [facility] [address]

## 2022-07-05 ENCOUNTER — HOSPITAL ENCOUNTER (OUTPATIENT)
Dept: PHYSICAL THERAPY | Age: 67
Discharge: HOME | End: 2022-07-05
Payer: COMMERCIAL

## 2022-07-05 PROCEDURE — 97110 THERAPEUTIC EXERCISES: CPT

## 2022-07-05 PROCEDURE — 97112 NEUROMUSCULAR REEDUCATION: CPT

## 2022-07-05 PROCEDURE — 97530 THERAPEUTIC ACTIVITIES: CPT

## 2022-07-05 NOTE — PROGRESS NOTES
PHYSICAL THERAPY - DAILY TREATMENT NOTE    Patient Name: Juanita Nguyen        Date: 2022  : 1955   YES Patient  Verified  Visit #:      12  Insurance: Payor: Estefani Racer / Plan: [place] MEDICARE PART A & B / Product Type: Medicare /      In time: 7:15 A Out time: 8:15 A   Total Treatment Time: 55     BCBS/Medicare Time Tracking (below)   Total Timed Codes (min):  45 1:1 Treatment Time:  45     TREATMENT AREA =  Pain in right knee [M25.561]  SUBJECTIVE  Pain Level (on 0 to 10 scale):  1  / 10   Medication Changes/New allergies or changes in medical history, any new surgeries or procedures? NO    If yes, update Summary List   Subjective Functional Status/Changes:  []  No changes reported     Patient reports did better after last PT visit, not as sore. She was able to get all the way around on her bike at home. She still has some pain in her R hip at times.            Modalities Rationale:     decrease edema, decrease inflammation and decrease pain to improve patient's ability to return to pain-free ADLs    min [] Estim, type/location:                                      []  att     []  unatt     []  w/US     []  w/ice    []  w/heat    min []  Mechanical Traction: type/lbs                   []  pro   []  sup   []  int   []  cont    []  before manual    []  after manual    min []  Ultrasound, settings/location:      min []  Iontophoresis w/ dexamethasone, location:                                               []  take home patch       []  in clinic   10 min [x]  Ice     []  Heat    location/position: Supine to R knee     min []  Vasopneumatic Device, press/temp:    If using vaso (only need to measure limb vaso being performed on)      pre-treatment girth :       post-treatment girth :       measured at (landmark location) :      min []  Other:    [] Skin assessment post-treatment (if applicable):    [x]  intact    [x]  redness- no adverse reaction                  []redness - adverse reaction: 15 min Therapeutic Exercise:  [x]  See flow sheet   Rationale:      increase ROM and increase strength to improve the patients ability to return to pain-free walking program     15 min Therapeutic Activity: [x]  See flow sheet   Rationale:    increase ROM, increase strength, improve balance and increase proprioception to improve the patients ability to return to pain-free squatting     15 min Neuromuscular Re-ed: [x]  See flow sheet   Rationale:    increase ROM, increase strength, improve coordination, improve balance and increase proprioception to improve the patients ability to improve gait mechanics with decreased extensor lag      Billed With/As:   [] TE   [] TA   [] Neuro   [] Self Care Patient Education: [x] Review HEP    [] Progressed/Changed HEP based on:   [] positioning   [] body mechanics   [] transfers   [] heat/ice application    [] other:      Other Objective/Functional Measures: Added prone hip extension over pillow     Post Treatment Pain Level (on 0 to 10) scale:  1/ 10     ASSESSMENT  Assessment/Changes in Function:     Good tolerance to today's treatment improved TKE after initial stretching      []  See Progress Note/Recertification   Patient will continue to benefit from skilled PT services to modify and progress therapeutic interventions, address functional mobility deficits, address ROM deficits, address strength deficits, analyze and address soft tissue restrictions, analyze and cue movement patterns, analyze and modify body mechanics/ergonomics, assess and modify postural abnormalities, address imbalance/dizziness and instruct in home and community integration to attain remaining goals.    Progress toward goals / Updated goals:    Progressing towards STG 2 & LTG 4     PLAN  []  Upgrade activities as tolerated YES Continue plan of care   []  Discharge due to :    []  Other:      Therapist: Mike Hampton PT    Date: 7/5/2022 Time: 7:35 AM     Future Appointments   Date Time Provider Rhett Parra   7/8/2022  7:15 AM Pamela Mater, PT [facility]   7/11/2022  7:15 AM Pamela Mater, PT [facility]   7/15/2022  7:15 AM Pamela Mater, PT [facility]   7/18/2022  7:15 AM Pamela Mater, PT [facility]   7/22/2022  7:15 AM Pamela Mater, PT [facility]   7/25/2022  7:15 AM Pamela Mater, PT [facility]   7/29/2022  7:15 AM Iftikhar Madden, PT [facility]

## 2022-07-08 ENCOUNTER — HOSPITAL ENCOUNTER (OUTPATIENT)
Dept: PHYSICAL THERAPY | Age: 67
Discharge: HOME | End: 2022-07-08
Payer: COMMERCIAL

## 2022-07-08 PROCEDURE — 97112 NEUROMUSCULAR REEDUCATION: CPT

## 2022-07-08 PROCEDURE — 97110 THERAPEUTIC EXERCISES: CPT

## 2022-07-08 PROCEDURE — 97530 THERAPEUTIC ACTIVITIES: CPT

## 2022-07-08 NOTE — PROGRESS NOTES
PHYSICAL THERAPY - DAILY TREATMENT NOTE    Patient Name: Lizbet Willis        Date: 2022  : 1955   YES Patient  Verified  Visit #:      of   12  Insurance: Payor: Ya Yang / Plan: [place] MEDICARE PART A & B / Product Type: Medicare /      In time: 7:20 A Out time: 8:20 A   Total Treatment Time: 55     BCBS/Medicare Time Tracking (below)   Total Timed Codes (min):  45 1:1 Treatment Time:  45     TREATMENT AREA =  Pain in right knee [M25.561]  SUBJECTIVE  Pain Level (on 0 to 10 scale):  1  / 10   Medication Changes/New allergies or changes in medical history, any new surgeries or procedures? NO    If yes, update Summary List   Subjective Functional Status/Changes:  []  No changes reported     Patient reports she is still having pain on the outside of her R hip when she rolls over in bed & or when she gets up after sitting for a long time.            Modalities Rationale:     decrease edema, decrease inflammation and decrease pain to improve patient's ability to return to pain-free ADLs    min [] Estim, type/location:                                      []  att     []  unatt     []  w/US     []  w/ice    []  w/heat    min []  Mechanical Traction: type/lbs                   []  pro   []  sup   []  int   []  cont    []  before manual    []  after manual    min []  Ultrasound, settings/location:      min []  Iontophoresis w/ dexamethasone, location:                                               []  take home patch       []  in clinic   10 min [x]  Ice     []  Heat    location/position: Supine to R knee     min []  Vasopneumatic Device, press/temp:    If using vaso (only need to measure limb vaso being performed on)      pre-treatment girth :       post-treatment girth :       measured at (landmark location) :      min []  Other:    [] Skin assessment post-treatment (if applicable):    [x]  intact    [x]  redness- no adverse reaction                  []redness - adverse reaction:        15 min Therapeutic Exercise:  [x]  See flow sheet   Rationale:      increase ROM and increase strength to improve the patients ability to return to pain-free walking program      15 min Therapeutic Activity: [x]  See flow sheet   Rationale:    increase ROM, increase strength, improve coordination and increase proprioception to improve the patients ability to return to pain-free squatting    15 min Neuromuscular Re-ed: [x]  See flow sheet   Rationale:    improve coordination, improve balance and increase proprioception to improve the patients ability to return to ADLs with decreased fall risk       Billed With/As:   [] TE   [] TA   [] Neuro   [] Self Care Patient Education: [x] Review HEP    [] Progressed/Changed HEP based on:   [] positioning   [] body mechanics   [] transfers   [] heat/ice application    [] other:      Other Objective/Functional Measures:    Modified all exercises in longsitting (see flow sheet), held S/L exercises, added supine hip ER stretch     Post Treatment Pain Level (on 0 to 10) scale:   1  / 10     ASSESSMENT  Assessment/Changes in Function:     Moderate c/o pain with rolling from supine to S/L to prone despite use of pillow between knees, c/o pain into R lateral hip, mild c/o groin pain with supine ER stretch although did not reproduce lateral hip pain     []  See Progress Note/Recertification   Patient will continue to benefit from skilled PT services to modify and progress therapeutic interventions, address functional mobility deficits, address ROM deficits, address strength deficits, analyze and address soft tissue restrictions, analyze and cue movement patterns, analyze and modify body mechanics/ergonomics, assess and modify postural abnormalities, address imbalance/dizziness and instruct in home and community integration to attain remaining goals.    Progress toward goals / Updated goals:    Progressing towards STG 2 & 3     PLAN  []  Upgrade activities as tolerated YES Continue plan of care   []  Discharge due to :    []  Other:      Therapist: Tigre Lerma PT    Date: 7/8/2022 Time: 8:29 AM     Future Appointments   Date Time Provider Rhett Parra   7/11/2022  7:15 AM Ted Jeff Davis, PT [facility]   7/15/2022  7:15 AM [facility], PT [facility]   7/18/2022  7[facility]   7/22/2022  7[facility]   7/25/2022  7:15 AM Ted Jeff Davis, PT [facility]   7/29/2022  7:15 AM Talita Madden, PT [facility]

## 2022-07-11 ENCOUNTER — HOSPITAL ENCOUNTER (OUTPATIENT)
Dept: PHYSICAL THERAPY | Age: 67
Discharge: HOME | End: 2022-07-11
Payer: COMMERCIAL

## 2022-07-11 PROCEDURE — 97530 THERAPEUTIC ACTIVITIES: CPT

## 2022-07-11 PROCEDURE — 97110 THERAPEUTIC EXERCISES: CPT

## 2022-07-11 NOTE — PROGRESS NOTES
PHYSICAL THERAPY - DAILY TREATMENT NOTE    Patient Name: Julisa Parsons        Date: 2022  : 1955   YES Patient  Verified  Visit #:     Insurance: Payor: Deandra Tanya / Plan: [place] MEDICARE PART A & B / Product Type: Medicare /      In time: 7:20 A Out time: 8:20 A   Total Treatment Time: 55     BCBS/Medicare Time Tracking (below)   Total Timed Codes (min):  55 1:1 Treatment Time:  45     TREATMENT AREA =  Pain in right knee [M25.561]  SUBJECTIVE  Pain Level (on 0 to 10 scale):  2  / 10   Medication Changes/New allergies or changes in medical history, any new surgeries or procedures?     NO    If yes, update Summary List   Subjective Functional Status/Changes:  []  No changes reported   Patient reports her knee is sore, she was twisted in her bed sheets when rolling over & felt pain in her R knee & had to get up to ice her knee           Modalities Rationale:     decrease edema, decrease inflammation and decrease pain to improve patient's ability to return to pain-free ADLs   min [] Estim, type/location:                                      []  att     []  unatt     []  w/US     []  w/ice    []  w/heat    min []  Mechanical Traction: type/lbs                   []  pro   []  sup   []  int   []  cont    []  before manual    []  after manual    min []  Ultrasound, settings/location:      min []  Iontophoresis w/ dexamethasone, location:                                               []  take home patch       []  in clinic   10 min [x]  Ice     []  Heat    location/position: Supine to R knee     min []  Vasopneumatic Device, press/temp:    If using vaso (only need to measure limb vaso being performed on)      pre-treatment girth :       post-treatment girth :       measured at (landmark location) :      min []  Other:    [] Skin assessment post-treatment (if applicable):    [x]  intact    [x]  redness- no adverse reaction                  []redness - adverse reaction:        30 min Therapeutic Exercise:  [x]  See flow sheet   Rationale:      increase ROM and increase strength to improve the patients ability to return to pain-free standing     15 min Therapeutic Activity: [x]  See flow sheet   Rationale:    increase ROM, increase strength, improve balance and increase proprioception to improve the patients ability to return to pain-free squatting     Billed With/As:   [] TE   [] TA   [] Neuro   [] Self Care Patient Education: [x] Review HEP    [] Progressed/Changed HEP based on:   [] positioning   [] body mechanics   [] transfers   [] heat/ice application    [] other:      Other Objective/Functional Measures:    Flexion: 75 degrees, limited by pain (supine flexion with belt)     Post Treatment Pain Level (on 0 to 10) scale:   2  / 10     ASSESSMENT  Assessment/Changes in Function:     Flexion stretches limited by pain today, resumed standing exercises, pain decreased to baseline after CP today    []  See Progress Note/Recertification   Patient will continue to benefit from skilled PT services to modify and progress therapeutic interventions, address functional mobility deficits, address ROM deficits, address strength deficits, analyze and address soft tissue restrictions, analyze and cue movement patterns, analyze and modify body mechanics/ergonomics, assess and modify postural abnormalities, address imbalance/dizziness and instruct in home and community integration to attain remaining goals.    Progress toward goals / Updated goals:    Progressing towards STG 2      PLAN  []  Upgrade activities as tolerated YES Continue plan of care   []  Discharge due to :    []  Other:      Therapist: Mike Hampton PT    Date: 7/11/2022 Time: 7:35 AM     Future Appointments   Date Time Provider Rhett Parra   7/15/2022  7:15 AM Ivy Maguire PT [facility]   7/18/2022  7:15 AM [facility]   7/22/2022  7:15 AM Ivy Maguire PT [facility]   7/25/2022  7:15 AM Maryanne Arin Robbins, PT [facility]   7/29/2022  7:15 AM Arin Madden PT [facility]

## 2022-07-15 ENCOUNTER — HOSPITAL ENCOUNTER (OUTPATIENT)
Dept: PHYSICAL THERAPY | Age: 67
Discharge: HOME | End: 2022-07-15
Payer: COMMERCIAL

## 2022-07-15 PROCEDURE — 97112 NEUROMUSCULAR REEDUCATION: CPT

## 2022-07-15 PROCEDURE — 97110 THERAPEUTIC EXERCISES: CPT

## 2022-07-15 PROCEDURE — 97530 THERAPEUTIC ACTIVITIES: CPT

## 2022-07-15 NOTE — PROGRESS NOTES
PHYSICAL THERAPY - DAILY TREATMENT NOTE    Patient Name: Tracey Howard        Date: 7/15/2022  : 1955   YES Patient  Verified  Visit #:     Insurance: Payor: Amandeep Aguilar / Plan: [place] MEDICARE PART A & B / Product Type: Medicare /      In time: 7:20 A Out time: 8:10 A   Total Treatment Time: 50     BCBS/Medicare Time Tracking (below)   Total Timed Codes (min):  40 1:1 Treatment Time:  40     TREATMENT AREA =  Pain in right knee [M25.561]    SUBJECTIVE  Pain Level (on 0 to 10 scale):  2  / 10   Medication Changes/New allergies or changes in medical history, any new surgeries or procedures? NO    If yes, update Summary List   Subjective Functional Status/Changes:  []  No changes reported     Patient reports she rested for about 2 day after last treatment & took tylenol & her R knee improved. Her pain is intermittent, average pain level 1-2/10.             Modalities Rationale:     decrease edema, decrease inflammation and decrease pain to improve patient's ability to return to pain-free ADLs    min [] Estim, type/location:                                      []  att     []  unatt     []  w/US     []  w/ice    []  w/heat    min []  Mechanical Traction: type/lbs                   []  pro   []  sup   []  int   []  cont    []  before manual    []  after manual    min []  Ultrasound, settings/location:      min []  Iontophoresis w/ dexamethasone, location:                                               []  take home patch       []  in clinic   10 min [x]  Ice     []  Heat    location/position: Supine to R knee    min []  Vasopneumatic Device, press/temp:    If using vaso (only need to measure limb vaso being performed on)      pre-treatment girth :       post-treatment girth :       measured at (landmark location) :      min []  Other:    [] Skin assessment post-treatment (if applicable):    [x]  intact    [x]  redness- no adverse reaction                  []redness - adverse reaction:         15 min Therapeutic Exercise:  [x]  See flow sheet   Rationale:      increase ROM and increase strength to improve the patients ability to return to pain-free ADLs      15 min Therapeutic Activity: [x]  See flow sheet   Rationale:    increase ROM, increase strength and increase proprioception to improve the patients ability to return to pain-free squatting    10 min Neuromuscular Re-ed: [x]  See flow sheet   Rationale:    increase ROM, increase strength, improve balance and increase proprioception to improve the patients ability to return to ambulation without extensor lag       Billed With/As:   [] TE   [] TA   [] Neuro   [] Self Care Patient Education: [x] Review HEP    [] Progressed/Changed HEP based on:   [] positioning   [] body mechanics   [] transfers   [] heat/ice application    [] other:      Other Objective/Functional Measures:    AROM: 0-110 degrees      Post Treatment Pain Level (on 0 to 10) scale:   0  / 10     ASSESSMENT  Assessment/Changes in Function:     Improved tolerance to flexion based stretches today, patient able to complete full revolutions on upright bike today, reviewed possible return to driving this week given good progress with ROM      []  See Progress Note/Recertification   Patient will continue to benefit from skilled PT services to modify and progress therapeutic interventions, address functional mobility deficits, address ROM deficits, address strength deficits, analyze and address soft tissue restrictions, analyze and cue movement patterns, analyze and modify body mechanics/ergonomics, assess and modify postural abnormalities, address imbalance/dizziness and instruct in home and community integration to attain remaining goals.    Progress toward goals / Updated goals:    STG 1, 4 met     PLAN  []  Upgrade activities as tolerated YES Continue plan of care   []  Discharge due to :    [x]  Other: Progress note n/v     Therapist: Harry Richardson PT    Date: 7/15/2022 Time: 7:25 AM Future Appointments   Date Time Provider Rhett Parra   7/18/2022  7:15 AM [facility]   7/22/2022  7:15 AM \[facility]", PT [facility]   7/25/2022  7:15 AM Beckie Shevlin, PT [facility]   7/29/2022  7:15 AM Anneliese Madden, PT [facility]

## 2022-07-18 ENCOUNTER — HOSPITAL ENCOUNTER (OUTPATIENT)
Dept: PHYSICAL THERAPY | Age: 67
Discharge: HOME | End: 2022-07-18
Payer: COMMERCIAL

## 2022-07-18 PROCEDURE — 97530 THERAPEUTIC ACTIVITIES: CPT

## 2022-07-18 PROCEDURE — 97110 THERAPEUTIC EXERCISES: CPT

## 2022-07-18 PROCEDURE — 97112 NEUROMUSCULAR REEDUCATION: CPT

## 2022-07-18 NOTE — PROGRESS NOTES
PHYSICAL THERAPY - DAILY TREATMENT NOTE    Patient Name: Cameron Ceja        Date: 2022  : 1955   YES Patient  Verified  Visit #:     Insurance: Payor: Deena La / Plan: [place] MEDICARE PART A & B / Product Type: Medicare /      In time: 7:25 A Out time: 8:20 A   Total Treatment Time: 55     BCBS/Medicare Time Tracking (below)   Total Timed Codes (min):  45 1:1 Treatment Time:  45     TREATMENT AREA =  Pain in right knee [M25.561]    SUBJECTIVE  Pain Level (on 0 to 10 scale):  0  / 10   Medication Changes/New allergies or changes in medical history, any new surgeries or procedures?     NO    If yes, update Summary List   Subjective Functional Status/Changes:  []  No changes reported     See progress note to MD            Modalities Rationale:     decrease edema, decrease inflammation and decrease pain to improve patient's ability to return to pain-free ADLs    min [] Estim, type/location:                                      []  att     []  unatt     []  w/US     []  w/ice    []  w/heat    min []  Mechanical Traction: type/lbs                   []  pro   []  sup   []  int   []  cont    []  before manual    []  after manual    min []  Ultrasound, settings/location:      min []  Iontophoresis w/ dexamethasone, location:                                               []  take home patch       []  in clinic   10 min [x]  Ice     []  Heat    location/position: Supine to R knee     min []  Vasopneumatic Device, press/temp:    If using vaso (only need to measure limb vaso being performed on)      pre-treatment girth :       post-treatment girth :       measured at (landmark location) :      min []  Other:    [] Skin assessment post-treatment (if applicable):    [x]  intact    [x]  redness- no adverse reaction                  []redness - adverse reaction:        15 min Therapeutic Exercise:  [x]  See flow sheet   Rationale:      increase ROM and increase strength to improve the patients ability to return to pain-free stair negotiation      15 min Therapeutic Activity: [x]  See flow sheet   Rationale:    increase ROM, increase strength, improve balance and increase proprioception to improve the patients ability to return to work at Home [employer]    15 min Neuromuscular Re-ed: [x]  See flow sheet   Rationale:    increase ROM, increase strength, improve balance and increase proprioception to improve the patients ability to return to pain-free standing at work       Billed With/As:   [] TE   [] TA   [] Neuro   [] Self Care Patient Education: [x] Review HEP    [] Progressed/Changed HEP based on:   [] positioning   [] body mechanics   [] transfers   [] heat/ice application    [] other:      Other Objective/Functional Measures:    FOTO 68 points     Post Treatment Pain Level (on 0 to 10) scale:   0  / 10     ASSESSMENT  Assessment/Changes in Function:     Mild ERP with flexion stretches, reproduction of posterior thigh pain with longsitting exercises     []  See Progress Note/Recertification   Patient will continue to benefit from skilled PT services to modify and progress therapeutic interventions, address functional mobility deficits, address ROM deficits, address strength deficits, analyze and address soft tissue restrictions, analyze and cue movement patterns, analyze and modify body mechanics/ergonomics, assess and modify postural abnormalities, address imbalance/dizziness and instruct in home and community integration to attain remaining goals.    Progress toward goals / Updated goals:    STG 1 & 3 met, STG 2, 4 partially met      PLAN  []  Upgrade activities as tolerated YES Continue plan of care   []  Discharge due to :    []  Other:      Therapist: Angel Tesfaye PT    Date: 7/18/2022 Time: 7:32 AM     Future Appointments   Date Time Provider Rhett Parra   7/22/2022  7:15 AM Kymberly Pathak PT [facility]   7/25/2022  7:15 AM JOSHUA Samaniego [facility]   7/29/2022  7:15 AM Maryanne Janine Cummings, PT [facility]

## 2022-07-18 NOTE — PROGRESS NOTES
[address] [place] [address] PHYSICAL THERAPY AT [address] [place] [address]  Romel Copeland [address], [address] ,[address], [address] [facility] [address]  Phone: [phone]  Fax: [phone]  PROGRESS NOTE  Patient Name: Georgina Alcala : 1955   Treatment/Medical Diagnosis: Pain in right knee [M25.561]   S/p R TKR (DOS 22)   Referral Source: Jennifer Gonzales MD     Date of Initial Visit: 22 Attended Visits: 8 Missed Visits: 0     SUMMARY OF TREATMENT  Therapeutic exercise including ROM, stretching, gentle strengthening, gait & balance training, postural ed, patient education, HEP instruction, CP. CURRENT STATUS  Mrs. Serge Nam has made good progress with PT & reports intermittent c/o pain in R knee which takes tylenol on a prn basis to manage her symptoms. TKE has improved & she continues to demonstrate improved gait mechanics with decreased extensor lag. She continues to report intermittent c/o R hip pain that at times radiates into posterior thigh after periods of prolonged sitting & first thing in the morning & resolves after gentle stretching. She continues to report mild c/o pain with flexion based stretches, passive flexion at 110 degrees (85 degrees prior to surgery). She is interested in possibly returning to work at limited hours at [employer] & has recently returned to driving without complaints. Goal/Measure of Progress Goal Met? 1.  Establish HEP to prevent further disability. Status at last Eval: NA Current Status: HEP established  yes   2. Patient will report decreased c/o pain to < or = 0-1/10 at worst to facilitate sleeping at night uninterrupted with manageable sx in R knee. Status at last Eval: At worst 2/10 Current Status: At worst 1-2/10 Progressing    3. Improve FOTO score from 58 points to > or = 60 points indicating improved tolerance with ADLs in regards to R knee. Status at last Eval: 58 Current Status: 68 yes   4.   Patient will improve R knee AROM to 2-110 degrees so ROM is available for dressing activities. Status at last Eval: 7-103 deg Current Status: PROM  0-110 degrees Progressing     New Goals to be achieved in __3-4__  weeks:  1. Improve overall strength of R knee to 5-/5 with no c/o pain upon MMT so strength is available for return to work at full duty. 2.  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 3.  Patient will improve R knee AROM to 0-115 degrees so ROM is available for dressing activities. 4.  Patient will RTW at full duty up to half days with manageable symptoms in R knee. RECOMMENDATIONS  Patient can benefit from continued PT interventions in order to progress towards LTGs   If you have any questions/comments please contact us directly at [phone]. Thank you for allowing us to assist in the care of your patient. Therapist Signature: SIMBA Boland, cert MDT Date: 8/49/6792   Certification Period:  Reporting Period: 7-18-22 to 10-16-22  6-22-22 to 7-18-22  Time: 7:32 AM     NOTE TO PHYSICIAN:  PLEASE COMPLETE THE ORDERS BELOW AND FAX TO   [facility]: [phone]. If you are unable to process this request in 24 hours please contact our office: [phone].    ___ I have read the above report and request that my patient continue as recommended.   ___ I have read the above report and request that my patient continue therapy with the following changes/special instructions:_________________________________________________________   ___ I have read the above report and request that my patient be discharged from therapy.      Physician Signature:                                                             Date:                                     Time:                                                                       Juan Moreno MD

## 2022-07-22 ENCOUNTER — HOSPITAL ENCOUNTER (OUTPATIENT)
Dept: PHYSICAL THERAPY | Age: 67
Discharge: HOME | End: 2022-07-22
Payer: COMMERCIAL

## 2022-07-22 PROCEDURE — 97110 THERAPEUTIC EXERCISES: CPT

## 2022-07-22 PROCEDURE — 97530 THERAPEUTIC ACTIVITIES: CPT

## 2022-07-22 PROCEDURE — 97112 NEUROMUSCULAR REEDUCATION: CPT

## 2022-07-22 NOTE — PROGRESS NOTES
PHYSICAL THERAPY - DAILY TREATMENT NOTE    Patient Name: Leonila Snell        Date: 2022  : 1955   YES Patient  Verified  Visit #:     Insurance: Payor: Jose Eduardo Patel / Plan: [place] MEDICARE PART A & B / Product Type: Medicare /      In time: 7:25 A Out time: 8:30 A   Total Treatment Time: 60     BCBS/Medicare Time Tracking (below)   Total Timed Codes (min):  50 1:1 Treatment Time:  40     TREATMENT AREA =  Pain in right knee [M25.561]    SUBJECTIVE  Pain Level (on 0 to 10 scale):  0  / 10   Medication Changes/New allergies or changes in medical history, any new surgeries or procedures? NO    If yes, update Summary List   Subjective Functional Status/Changes:  []  No changes reported     Patient reports that she had follow up with MD, and he was pleased with her progress she does not have to go back until 1 year post op, she plans to return to work half days the first week of August. She more pain in the back of her R hip & started to feel something into her lower leg yesterday, but no pain today.           Modalities Rationale:     decrease edema, decrease inflammation, and decrease pain to improve patient's ability to return to pain-free ADLs    min [] Estim, type/location:                                      []  att     []  unatt     []  w/US     []  w/ice    []  w/heat    min []  Mechanical Traction: type/lbs                   []  pro   []  sup   []  int   []  cont    []  before manual    []  after manual    min []  Ultrasound, settings/location:      min []  Iontophoresis w/ dexamethasone, location:                                               []  take home patch       []  in clinic   10 min [x]  Ice     []  Heat    location/position: Supine to R knee     min []  Vasopneumatic Device, press/temp:    If using vaso (only need to measure limb vaso being performed on)      pre-treatment girth :       post-treatment girth :       measured at (landmark location) :      min []  Other: [x] Skin assessment post-treatment (if applicable):    [x]  intact    [x]  redness- no adverse reaction                  []redness - adverse reaction:        20 min Therapeutic Exercise:  [x]  See flow sheet   Rationale:      increase ROM and increase strength to improve the patients ability to return to pain-free lifting      20 min Therapeutic Activity: [x]  See flow sheet   Rationale:    increase ROM and increase strength to improve the patients ability to turn to return to pain-free squatting     10 min Neuromuscular Re-ed: [x]  See flow sheet   Rationale:    increase ROM, increase strength, improve balance, and increase proprioception to improve the patients ability to return to pain-free work activities with decreased fall risk      Billed With/As:   [] TE   [] TA   [] Neuro   [] Self Care Patient Education: [x] Review HEP    [] Progressed/Changed HEP based on:   [] positioning   [] body mechanics   [] transfers   [] heat/ice application    [] other:      Other Objective/Functional Measures: Added step downs (lateral) on 4 inch step      Post Treatment Pain Level (on 0 to 10) scale:   0  / 10     ASSESSMENT  Assessment/Changes in Function:     Good tolerance  to today's treatment no reproduction of R radicular pain     []  See Progress Note/Recertification   Patient will continue to benefit from skilled PT services to modify and progress therapeutic interventions, address functional mobility deficits, address ROM deficits, address strength deficits, analyze and address soft tissue restrictions, analyze and cue movement patterns, analyze and modify body mechanics/ergonomics, assess and modify postural abnormalities, address imbalance/dizziness, and instruct in home and community integration to attain remaining goals.    Progress toward goals / Updated goals:    Progressing towards LTG 1 & 2     PLAN  []  Upgrade activities as tolerated YES Continue plan of care   []  Discharge due to :    []  Other: Therapist: Kari Callaway, PT    Date: 7/22/2022 Time: 9:12 AM     Future Appointments   Date Time Provider Rhett Parra   7/25/2022  7:15 AM Roselyn Hill, PT [facility]   7/29/2022  7:15 AM Jamie Madden Si, PT [facility]

## 2022-07-25 ENCOUNTER — HOSPITAL ENCOUNTER (OUTPATIENT)
Dept: PHYSICAL THERAPY | Age: 67
Discharge: HOME | End: 2022-07-25
Payer: COMMERCIAL

## 2022-07-25 PROCEDURE — 97110 THERAPEUTIC EXERCISES: CPT

## 2022-07-25 PROCEDURE — 97112 NEUROMUSCULAR REEDUCATION: CPT

## 2022-07-25 PROCEDURE — 97530 THERAPEUTIC ACTIVITIES: CPT

## 2022-07-25 NOTE — PROGRESS NOTES
PHYSICAL THERAPY - DAILY TREATMENT NOTE    Patient Name: Julisa Parsons        Date: 2022  : 1955   YES Patient  Verified  Visit #:   10   of   12  Insurance: Payor: Deandra Tanya / Plan: [place] MEDICARE PART A & B / Product Type: Medicare /      In time: 7:15 A Out time: 8:15 A   Total Treatment Time: 55     BCBS/Medicare Time Tracking (below)   Total Timed Codes (min):  45 1:1 Treatment Time:  45     TREATMENT AREA =  Pain in right knee [M25.561]    SUBJECTIVE  Pain Level (on 0 to 10 scale):  0  / 10   Medication Changes/New allergies or changes in medical history, any new surgeries or procedures? NO    If yes, update Summary List   Subjective Functional Status/Changes:  []  No changes reported     Patient reports she notices that her hip feels better if she works on her stretches beforehand she feels better, she will plan to go back to work half days on Monday.   She would like to continue with PT when she goes back to work           Modalities Rationale:     decrease edema, decrease inflammation, and decrease pain to improve patient's ability to return to pain-free standing    min [] Estim, type/location:                                      []  att     []  unatt     []  w/US     []  w/ice    []  w/heat    min []  Mechanical Traction: type/lbs                   []  pro   []  sup   []  int   []  cont    []  before manual    []  after manual    min []  Ultrasound, settings/location:      min []  Iontophoresis w/ dexamethasone, location:                                               []  take home patch       []  in clinic   10 min [x]  Ice     []  Heat    location/position: Supine to R knee     min []  Vasopneumatic Device, press/temp:    If using vaso (only need to measure limb vaso being performed on)      pre-treatment girth :       post-treatment girth :       measured at (landmark location) :      min []  Other:    [] Skin assessment post-treatment (if applicable):    [x]  intact    [x]  redness- no adverse reaction                  []redness - adverse reaction:        15 min Therapeutic Exercise:  [x]  See flow sheet   Rationale:      increase ROM, increase strength, improve balance, and increase proprioception to improve the patients ability to return to pain-free standing     15 min Therapeutic Activity: [x]  See flow sheet   Rationale:    increase ROM, increase strength, improve coordination, improve balance, and increase proprioception to improve the patients ability to return to descending stairs    15 min Neuromuscular Re-ed: [x]  See flow sheet   Rationale:    increase ROM, increase strength, improve balance, and increase proprioception to improve the patients ability to return to safe work activities with decreased fall risk, such as climbing ladders      Billed With/As:   [] TE   [] TA   [] Neuro   [] Self Care Patient Education: [x] Review HEP    [] Progressed/Changed HEP based on:   [] positioning   [] body mechanics   [] transfers   [] heat/ice application    [] other:      Other Objective/Functional Measures: Added forward step down off 4 inch step     Post Treatment Pain Level (on 0 to 10) scale:   0  / 10     ASSESSMENT  Assessment/Changes in Function:     Good tolerance to today's treatment no increase in R hip pain      []  See Progress Note/Recertification   Patient will continue to benefit from skilled PT services to modify and progress therapeutic interventions, address functional mobility deficits, address ROM deficits, address strength deficits, analyze and address soft tissue restrictions, analyze and cue movement patterns, analyze and modify body mechanics/ergonomics, assess and modify postural abnormalities, address imbalance/dizziness, and instruct in home and community integration to attain remaining goals.    Progress toward goals / Updated goals:    Progressing towards LTG 1, 2     PLAN  []  Upgrade activities as tolerated YES Continue plan of care   []  Discharge due to : []  Other:      Therapist: Tigre Lerma PT    Date: 7/25/2022 Time: 7:38 AM     Future Appointments   Date Time Provider Rhett Parra   7/29/2022  7:15 AM Talita Madden, PT [facility]

## 2022-07-29 ENCOUNTER — HOSPITAL ENCOUNTER (OUTPATIENT)
Dept: PHYSICAL THERAPY | Age: 67
Discharge: HOME | End: 2022-07-29
Payer: COMMERCIAL

## 2022-07-29 PROCEDURE — 97112 NEUROMUSCULAR REEDUCATION: CPT

## 2022-07-29 PROCEDURE — 97530 THERAPEUTIC ACTIVITIES: CPT

## 2022-07-29 PROCEDURE — 97110 THERAPEUTIC EXERCISES: CPT

## 2022-07-29 NOTE — PROGRESS NOTES
PHYSICAL THERAPY - DAILY TREATMENT NOTE    Patient Name: Brenna Kang        Date: 2022  : 1955   YES Patient  Verified  Visit #:     Insurance: Payor: Miracle Nunez / Plan: [address] [place] [address] / Product Type: PPO /      In time: 7:15 a Out time: 8:10 A   Total Treatment Time: 55     BCBS/Medicare Time Tracking (below)   Total Timed Codes (min):  45 1:1 Treatment Time:  45     TREATMENT AREA =  Pain in right knee [M25.561]    SUBJECTIVE  Pain Level (on 0 to 10 scale):  0  / 10   Medication Changes/New allergies or changes in medical history, any new surgeries or procedures?     NO    If yes, update Summary List   Subjective Functional Status/Changes:  []  No changes reported     Patient reports she had a steroid shot on her R shoulder on Tuesday, she had some pain in her R hip this morning when she was in the shower but does not have pain today           Modalities Rationale:     decrease edema, decrease inflammation, and decrease pain to improve patient's ability to return to pain-free ADLs   min [] Estim, type/location:                                      []  att     []  unatt     []  w/US     []  w/ice    []  w/heat    min []  Mechanical Traction: type/lbs                   []  pro   []  sup   []  int   []  cont    []  before manual    []  after manual    min []  Ultrasound, settings/location:      min []  Iontophoresis w/ dexamethasone, location:                                               []  take home patch       []  in clinic   10 min [x]  Ice     []  Heat    location/position: Supine to R knee     min []  Vasopneumatic Device, press/temp:    If using vaso (only need to measure limb vaso being performed on)      pre-treatment girth :       post-treatment girth :       measured at (landmark location) :      min []  Other:    [] Skin assessment post-treatment (if applicable):    [x]  intact    [x]  redness- no adverse reaction                  []redness - adverse reaction: 15 min Therapeutic Exercise:  [x]  See flow sheet   Rationale:      increase ROM, increase strength, improve balance, and increase proprioception to improve the patients ability to return to pain-free standing     15 min Therapeutic Activity: [x]  See flow sheet   Rationale:    increase ROM, increase strength, and improve coordination to improve the patients ability to return to stair negotiation    15 min Neuromuscular Re-ed: [x]  See flow sheet   Rationale:    improve coordination, improve balance, and increase proprioception to improve the patients ability to turn to safe work activities once released to full duty       Billed With/As:   [] TE   [] TA   [] Neuro   [] Self Care Patient Education: [x] Review HEP    [] Progressed/Changed HEP based on:   [] positioning   [] body mechanics   [] transfers   [] heat/ice application    [] other:      Other Objective/Functional Measures: Added standing ham curl with 0#, progressed lateral step down to 6 inch step    AROM: flexion 110 degrees in supine     Post Treatment Pain Level (on 0 to 10) scale:   0  / 10     ASSESSMENT  Assessment/Changes in Function:     Good tolerance to strength progressions today, no increase in R hip pain today      []  See Progress Note/Recertification   Patient will continue to benefit from skilled PT services to modify and progress therapeutic interventions, address functional mobility deficits, address ROM deficits, address strength deficits, analyze and address soft tissue restrictions, analyze and cue movement patterns, analyze and modify body mechanics/ergonomics, assess and modify postural abnormalities, address imbalance/dizziness, and instruct in home and community integration to attain remaining goals.    Progress toward goals / Updated goals:    Progressing towards LTG 1, 2     PLAN  []  Upgrade activities as tolerated YES Continue plan of care   []  Discharge due to :    []  Other:      Therapist: Harry Richardson, PT Date: 7/29/2022 Time: 8:54 AM     Future Appointments   Date Time Provider Rhett Parra   8/2/2022  7:15 AM Lonza Dieter, PT [facility] [place] [facility]   8/15/2022  8:45 AM Lonza Dieter, PT [facility]   8/22/2022 12:30 PM Shea Garcias, PT [facility]   8/29/2022 12:30 PM Ileana Madden, PT [facility]

## 2022-08-02 ENCOUNTER — HOSPITAL ENCOUNTER (OUTPATIENT)
Dept: PHYSICAL THERAPY | Age: 67
Discharge: HOME | End: 2022-08-02
Payer: COMMERCIAL

## 2022-08-02 PROCEDURE — 97110 THERAPEUTIC EXERCISES: CPT

## 2022-08-02 PROCEDURE — 97530 THERAPEUTIC ACTIVITIES: CPT

## 2022-08-02 PROCEDURE — 97112 NEUROMUSCULAR REEDUCATION: CPT

## 2022-08-02 NOTE — PROGRESS NOTES
PHYSICAL THERAPY - DAILY TREATMENT NOTE    Patient Name: Arlin Melvin        Date: 2022  : 1955   YES Patient  Verified  Visit #:     Insurance: Payor: Adam Hartman / Plan: [address] [place] [address] / Product Type: PPO /      In time: 7:20 A Out time: 8:20 A   Total Treatment Time: 55     BCBS/Medicare Time Tracking (below)   Total Timed Codes (min):  45 1:1 Treatment Time:  45     TREATMENT AREA =  Pain in right knee [M25.561]    SUBJECTIVE  Pain Level (on 0 to 10 scale):  0  / 10   Medication Changes/New allergies or changes in medical history, any new surgeries or procedures? NO    If yes, update Summary List   Subjective Functional Status/Changes:  []  No changes reported     Patient reports some stiffness in her R knee/hip.            Modalities Rationale:     decrease edema, decrease inflammation, and decrease pain to improve patient's ability to return to pain-free standing   min [] Estim, type/location:                                      []  att     []  unatt     []  w/US     []  w/ice    []  w/heat    min []  Mechanical Traction: type/lbs                   []  pro   []  sup   []  int   []  cont    []  before manual    []  after manual    min []  Ultrasound, settings/location:      min []  Iontophoresis w/ dexamethasone, location:                                               []  take home patch       []  in clinic   10 min [x]  Ice     []  Heat    location/position: Supine to R knee     min []  Vasopneumatic Device, press/temp:    If using vaso (only need to measure limb vaso being performed on)      pre-treatment girth :       post-treatment girth :       measured at (landmark location) :      min []  Other:    [] Skin assessment post-treatment (if applicable):    [x]  intact      redness- no adverse reaction                  []redness - adverse reaction:        15 min Therapeutic Exercise:  [x]  See flow sheet   Rationale:      increase ROM and increase strength to improve the patients ability to return to pain-free standing     15 min Therapeutic Activity: [x]  See flow sheet   Rationale:    increase ROM, increase strength, improve coordination, and increase proprioception to improve the patients ability to return to stair negotiation    15 min Neuromuscular Re-ed: [x]  See flow sheet   Rationale:    improve coordination, improve balance, and increase proprioception to improve the patients ability to return to safe work activities      Billed With/As:   [] TE   [] TA   [] Neuro   [] Self Care Patient Education: [x] Review HEP    [] Progressed/Changed HEP based on:   [] positioning   [] body mechanics   [] transfers   [] heat/ice application    [] other:      Other Objective/Functional Measures:    See flow sheet     Post Treatment Pain Level (on 0 to 10) scale:   0  / 10     ASSESSMENT  Assessment/Changes in Function:     Good tolerance to today's treatment, no increase in hip or knee pain, improved stability with both lateral & forward step downs      []  See Progress Note/Recertification   Patient will continue to benefit from skilled PT services to modify and progress therapeutic interventions, address functional mobility deficits, address ROM deficits, analyze and cue movement patterns, analyze and modify body mechanics/ergonomics, assess and modify postural abnormalities, address imbalance/dizziness, and instruct in home and community integration to attain remaining goals.    Progress toward goals / Updated goals:    Progressing towards LTG 1, 2     PLAN  []  Upgrade activities as tolerated YES Continue plan of care   []  Discharge due to :    []  Other:      Therapist: Tigre Lerma PT    Date: 8/2/2022 Time: 8:15 AM     Future Appointments   Date Time Provider Rhett Parra   8/15/2022  8:45 AM Ted Coates, PT [facility]   8/22/2022 12:30 PM Ted Coates PT [facility]   8/29/2022 12:30 PM Talita Madden, PT [facility]

## 2022-08-15 ENCOUNTER — APPOINTMENT (OUTPATIENT)
Dept: PHYSICAL THERAPY | Age: 67
End: 2022-08-15
Payer: COMMERCIAL

## 2022-08-19 ENCOUNTER — HOSPITAL ENCOUNTER (OUTPATIENT)
Dept: PHYSICAL THERAPY | Age: 67
Discharge: HOME | End: 2022-08-19
Payer: COMMERCIAL

## 2022-08-19 PROCEDURE — 97530 THERAPEUTIC ACTIVITIES: CPT

## 2022-08-19 PROCEDURE — 97112 NEUROMUSCULAR REEDUCATION: CPT

## 2022-08-19 PROCEDURE — 97110 THERAPEUTIC EXERCISES: CPT

## 2022-08-19 NOTE — PROGRESS NOTES
PHYSICAL THERAPY - DAILY TREATMENT NOTE    Patient Name: Rosina Ibarra        Date: 2022  : 1955   YES Patient  Verified  Visit #:   15   of   18  Insurance: Payor: Welby Found / Plan: [address] [place] [address] / Product Type: PPO /      In time: 8:05 A Out time: 9:00 A   Total Treatment Time: 55     BCBS/Medicare Time Tracking (below)   Total Timed Codes (min):  45 1:1 Treatment Time:  45     TREATMENT AREA =  Pain in right knee [M25.561]    SUBJECTIVE  Pain Level (on 0 to 10 scale):  0  / 10   Medication Changes/New allergies or changes in medical history, any new surgeries or procedures? NO    If yes, update Summary List   Subjective Functional Status/Changes:  []  No changes reported     Patient reports she has been having more pain in her hip, after  last PT she had pain that radiated down her leg into knee that lasted for 2 days.           Modalities Rationale:     decrease edema, decrease inflammation, and decrease pain to improve patient's ability to return to pain-free standing    min [] Estim, type/location:                                      []  att     []  unatt     []  w/US     []  w/ice    []  w/heat    min []  Mechanical Traction: type/lbs                   []  pro   []  sup   []  int   []  cont    []  before manual    []  after manual    min []  Ultrasound, settings/location:      min []  Iontophoresis w/ dexamethasone, location:                                               []  take home patch       []  in clinic   10 min [x]  Ice     []  Heat    location/position: Supine to R knee     min []  Vasopneumatic Device, press/temp:    If using vaso (only need to measure limb vaso being performed on)      pre-treatment girth :       post-treatment girth :       measured at (landmark location) :      min []  Other:    [] Skin assessment post-treatment (if applicable):    [x]  intact    [x]  redness- no adverse reaction                  []redness - adverse reaction:        15 min Therapeutic Exercise:  [x]  See flow sheet   Rationale:      increase ROM, increase strength, and improve coordination to improve the patients ability to return to pain-free walking program     15 min Therapeutic Activity: [x]  See flow sheet   Rationale:    increase strength, improve coordination, and improve balance to improve the patients ability to return to work next month    15 min Neuromuscular Re-ed: [x]  See flow sheet   Rationale:    improve coordination, improve balance, and increase proprioception to improve the patients ability to tolerate prolonged ambulation with good gait mechanics      Billed With/As:   [] TE   [] TA   [] Neuro   [] Self Care Patient Education: [x] Review HEP    [] Progressed/Changed HEP based on:   [] positioning   [] body mechanics   [] ttransfers   [] heat/ice application    [] other:      Other Objective/Functional Measures:    Modified exercises per flow sheet given slight increase in hip pain last visit     Post Treatment Pain Level (on 0 to 10) scale:   1  / 10     ASSESSMENT  Assessment/Changes in Function:     No increase in R knee/hip pain with today's treatment      []  See Progress Note/Recertification   Patient will continue to benefit from skilled PT services to modify and progress therapeutic interventions, address functional mobility deficits, address ROM deficits, address strength deficits, analyze and address soft tissue restrictions, analyze and cue movement patterns, analyze and modify body mechanics/ergonomics, assess and modify postural abnormalities, address imbalance/dizziness, and instruct in home and community integration to attain remaining goals.    Progress toward goals / Updated goals:    Progressing towards LTG 1,2      PLAN  []  Upgrade activities as tolerated YES Continue plan of care   []  Discharge due to :    []  Other:      Therapist: Harry Richardson PT    Date: 8/19/2022 Time: 11:32 AM     Future Appointments   Date Time Provider Rhett Parra   8/22/2022 12:30 PM Ruth Saenz [facility]   8/29/2022 12:30 PM Vito-Cruz, Rudean Phalen, PT [facility]

## 2022-08-22 ENCOUNTER — HOSPITAL ENCOUNTER (OUTPATIENT)
Dept: PHYSICAL THERAPY | Age: 67
Discharge: HOME | End: 2022-08-22
Payer: COMMERCIAL

## 2022-08-22 PROCEDURE — 97530 THERAPEUTIC ACTIVITIES: CPT

## 2022-08-22 PROCEDURE — 97112 NEUROMUSCULAR REEDUCATION: CPT

## 2022-08-22 PROCEDURE — 97110 THERAPEUTIC EXERCISES: CPT

## 2022-08-22 NOTE — PROGRESS NOTES
PHYSICAL THERAPY - DAILY TREATMENT NOTE    Patient Name: Abelino Lacey        Date: 2022  : 1955   YES Patient  Verified  Visit #:   15   of   18  Insurance: Payor: Fayetta Hamman / Plan: [address] [place] [address] / Product Type: PPO /      In time: 12:30 P Out time: 1:25 P   Total Treatment Time: 50     BCBS/Medicare Time Tracking (below)   Total Timed Codes (min):  40 1:1 Treatment Time:  40     TREATMENT AREA =  Pain in right knee [M25.561]    SUBJECTIVE  Pain Level (on 0 to 10 scale):  0  / 10   Medication Changes/New allergies or changes in medical history, any new surgeries or procedures? NO    If yes, update Summary List   Subjective Functional Status/Changes:  []  No changes reported     Patient reports her R knee feels more swollen, she feels her hip pain at times when she is walking.             Modalities Rationale:     decrease edema, decrease inflammation, and decrease pain to improve patient's ability to tolerate aDLs    min [] Estim, type/location:                                      []  att     []  unatt     []  w/US     []  w/ice    []  w/heat    min []  Mechanical Traction: type/lbs                   []  pro   []  sup   []  int   []  cont    []  before manual    []  after manual    min []  Ultrasound, settings/location:      min []  Iontophoresis w/ dexamethasone, location:                                               []  take home patch       []  in clinic   10 min [x]  Ice     []  Heat    location/position: Supine to R knee     min []  Vasopneumatic Device, press/temp:    If using vaso (only need to measure limb vaso being performed on)      pre-treatment girth :       post-treatment girth :       measured at (landmark location) :      min []  Other:    [] Skin assessment post-treatment (if applicable):    [x]  intact    [x]  redness- no adverse reaction                  []redness - adverse reaction:        15 min Therapeutic Exercise:  [x]  See flow sheet   Rationale: increase ROM and increase strength to improve the patients ability to return to pain-free sit to stand transfers     15 min Therapeutic Activity: [x]  See flow sheet   Rationale:    increase ROM, increase strength, improve balance, and increase proprioception to improve the patients ability to tolerate work activities    10 min Neuromuscular Re-ed: [x]  See flow sheet   Rationale:    improve coordination, improve balance, and increase proprioception to improve the patients ability to turn to safe ADLs       Billed With/As:   [] TE   [] TA   [] Neuro   [] Self Care Patient Education: [x] Review HEP    [] Progressed/Changed HEP based on:   [] positioning   [] body mechanics   [] transfers   [] heat/ice application    [] other:      Other Objective/Functional Measures:    See flow sheet    MMT R knee ext/flexion 5/5     Post Treatment Pain Level (on 0 to 10) scale:   0  / 10     ASSESSMENT  Assessment/Changes in Function:     Good tolerance to current treatment no increase in R hip/knee pain     []  See Progress Note/Recertification   Patient will continue to benefit from skilled PT services to modify and progress therapeutic interventions, address functional mobility deficits, address ROM deficits, address strength deficits, analyze and address soft tissue restrictions, analyze and cue movement patterns, analyze and modify body mechanics/ergonomics, assess and modify postural abnormalities, address imbalance/dizziness, and instruct in home and community integration to attain remaining goals.    Progress toward goals / Updated goals:    LTG 1, 2 met     PLAN  []  Upgrade activities as tolerated YES Continue plan of care   []  Discharge due to :    []  Other:      Therapist: Matthew Yin PT    Date: 8/22/2022 Time: 1:21 PM     Future Appointments   Date Time Provider Rhett Parra   8/29/2022 12:30 PM Eric Madden, PT [facility]

## 2022-08-29 ENCOUNTER — HOSPITAL ENCOUNTER (OUTPATIENT)
Dept: PHYSICAL THERAPY | Age: 67
Discharge: HOME | End: 2022-08-29
Payer: COMMERCIAL

## 2022-08-29 PROCEDURE — 97110 THERAPEUTIC EXERCISES: CPT

## 2022-08-29 PROCEDURE — 97112 NEUROMUSCULAR REEDUCATION: CPT

## 2022-08-29 PROCEDURE — 97530 THERAPEUTIC ACTIVITIES: CPT

## 2022-08-29 NOTE — PROGRESS NOTES
[address] [place] [address] PHYSICAL THERAPY AT [address] [place] [address]  Romel Min \[facility]\"" 03, [address] [place] [address], [address]  Phone: [phone]  Fax: [phone] FOR PHYSICAL THERAPY          Patient Name: Faheem Solano : 1955   Treatment/Medical Diagnosis: Pain in right knee [M25.561]  S/p R TKR (DOS 22)   Onset Date: 22    Referral Source: Donaldo Freed MD [facility]): 22   Prior Hospitalization: See Medical History Provider #: [id number]   Prior Level of Function: C/o R hip pain & limited standing/walking with ADLs & work   Comorbidities: H/o R THR, L TKR, HTN, depression   Medications: Verified on Patient Summary List   Visits from [facility]: 15 Missed Visits: 0     Goal/Measure of Progress Goal Met? 1. Patient to be independent & compliant with HEP in preparation for D/C. Status at last Eval: HEP established Current Status: Good compliance with HEP yes   2. Patient to report 75% improvement in overall function in preparation for return to recreational activities with manageable sx in R knee. Status at last Eval: NA Current Status: 98% improvement overall  yes     Key Functional Changes/Progress: Mrs. Carlos Walton has made good progress with PT, she generally reports no c/o pain in R knee, mild c/o tightness & aching. She continues to report primary c/o lateral hip pain which radiates down her leg, to just above the level of her knee. She ambulates with slight extensor lag on R although is able to achieve full passive extension. She has not yet returned to work at full duty & awaiting full clearance from her employer. She is pleased with her progress & feels ready for DC to HEP. Assessments/Recommendations: Discontinue therapy. Progressing towards or have reached established goals. If you have any questions/comments please contact us directly at [phone]. Thank you for allowing us to assist in the care of your patient.     Therapist Signature: SIMBA Renner, cert MDT Date: 8-99-80   Reporting Period: 8-22-22 to 8-29-22 Time: 1:36 PM

## 2022-08-29 NOTE — PROGRESS NOTES
PHYSICAL THERAPY - DAILY TREATMENT NOTE    Patient Name: Sidney Cain        Date: 2022  : 1955   YES Patient  Verified  Visit #:   15   of   15  Insurance: Payor: Harini Velasquez / Plan: [address] [place] [address] / Product Type: PPO /      In time: 12:40 P Out time: 1:35 P   Total Treatment Time: 50     BCBS/Medicare Time Tracking (below)   Total Timed Codes (min):  40 1:1 Treatment Time:  40     TREATMENT AREA =  Pain in right knee [M25.561]    SUBJECTIVE  Pain Level (on 0 to 10 scale):  0  / 10   Medication Changes/New allergies or changes in medical history, any new surgeries or procedures?     NO    If yes, update Summary List   Subjective Functional Status/Changes:  []  No changes reported     See DC summary           Modalities Rationale:     decrease edema, decrease inflammation, and decrease pain to improve patient's ability to return to pain-free ADLs    min [] Estim, type/location:                                      []  att     []  unatt     []  w/US     []  w/ice    []  w/heat    min []  Mechanical Traction: type/lbs                   []  pro   []  sup   []  int   []  cont    []  before manual    []  after manual    min []  Ultrasound, settings/location:      min []  Iontophoresis w/ dexamethasone, location:                                               []  take home patch       []  in clinic   10 min [x]  Ice     []  Heat    location/position: Supine to R knee     min []  Vasopneumatic Device, press/temp:    If using vaso (only need to measure limb vaso being performed on)      pre-treatment girth :       post-treatment girth :       measured at (landmark location) :      min []  Other:    [] Skin assessment post-treatment (if applicable):    [x]  intact    [x]  redness- no adverse reaction                  []redness - adverse reaction:        10 min Therapeutic Exercise:  [x]  See flow sheet   Rationale:      increase ROM and increase strength to improve the patients ability to tolerate prolonged ambulation      15 min Therapeutic Activity: [x]  See flow sheet   Rationale:    increase ROM, increase strength, improve coordination, improve balance, and increase proprioception to improve the patients ability to return to work at full duty    15 min Neuromuscular Re-ed: [x]  See flow sheet   Rationale:    improve coordination, improve balance, and increase proprioception to improve the patients ability to tolerate ladder negotiation at work with decreased fall risk      Billed With/As:   [] TE   [] TA   [] Neuro   [] Self Care Patient Education: [x] Review HEP    [] Progressed/Changed HEP based on:   [] positioning   [] body mechanics   [] transfers   [] heat/ice application    [] other:      Other Objective/Functional Measures:    See flow sheet     Post Treatment Pain Level (on 0 to 10) scale:   0  / 10     ASSESSMENT  Assessment/Changes in Function:     Patient in agreement with DC to HEP, issued green band for home     []  See Progress Note/Recertification   Patient will continue to benefit from skilled PT services to instruct in home and community integration to attain remaining goals. Progress toward goals / Updated goals: All LTGs met      PLAN  []  Upgrade activities as tolerated NO Continue plan of care   []  Discharge due to : Goals met    []  Other:      Therapist: Concetta Crigler, PT    Date: 8/29/2022 Time: 1:35 PM     No future appointments.

## 2022-08-29 NOTE — PROGRESS NOTES
Lewis Cruz PHYSICAL THERAPY AT [address] [place] [address] Juan\[facility]\""s [address], [address] [place] [address], [address]  Phone: [phone]  Fax: [phone]  PROGRESS NOTE  Patient Name: Sidney Cain : 1955   Treatment/Medical Diagnosis: Pain in right knee [M25.561]   S/p R TKR (DOS 22)   Referral Source: Darek Yung MD     Date of Initial Visit: 22 Attended Visits: 15 Missed Visits: 0     SUMMARY OF TREATMENT  Therapeutic exercise including ROM, stretching, gentle strengthening, gait & balance training, postural ed, patient education, HEP instruction, CP. CURRENT STATUS  Mrs. Jessica Sanchez has made good progress with PT & reports minimal to no c/o pain in R knee, she continues to report c/o R hip pain which at times radiates into her posterior thigh. She has not returned to work at this time & is awaiting to be released from  [facility]. Please see below for other improvements with PT. Goal/Measure of Progress Goal Met? 1. Improve overall strength of R knee to 5-/5 with no c/o pain upon MMT so strength is available for return to work at full duty. Status at last Eval: NA Current Status: 5-/5 overall  no c/o pain upon MMT yes   2. 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 Status at last Eval: Unable  Current Status: Patient able to perform to 6 inch step down with good stability yes   3. Patient will improve R knee AROM to 0-115 degrees so ROM is available for dressing activities. Status at last Eval: 0-110 degrees Current Status: 0-115 degrees  yes   4. Patient will RTW at full duty up to half days with manageable symptoms in R knee. Status at last Eval: NA Current Status: Patient has not returned to work at this time Progressing     New Goals to be achieved in __3-4__  weeks:  1.   Patient to be independent & compliant with HEP in preparation for D/C.   2.  Patient to report 75% improvement in overall function in preparation for return to recreational activities with manageable sx in R knee. RECOMMENDATIONS  Patient can benefit from continued PT interventions in order to progress towards LTGs & to progress towards DC. If you have any questions/comments please contact us directly at [phone]. Thank you for allowing us to assist in the care of your patient. Therapist Signature: SIMBA Ko, cert MDT Date: 2/76/7287   Certification Period:  Reporting Period: 8-22-22 to 11-20-22 7-18-22 to 8-22-22 Time: 12:30 PM     NOTE TO PHYSICIAN:  PLEASE COMPLETE THE ORDERS BELOW AND FAX TO   [facility] [place] [facility]: [phone]. If you are unable to process this request in 24 hours please contact our office: [phone].    ___ I have read the above report and request that my patient continue as recommended.   ___ I have read the above report and request that my patient continue therapy with the following changes/special instructions:_________________________________________________________   ___ I have read the above report and request that my patient be discharged from therapy.      Physician Signature:                                                             Date:                                     Time:                                                                       Sandra Carrington MD

## 2023-06-29 ENCOUNTER — HOSPITAL ENCOUNTER (OUTPATIENT)
Facility: HOSPITAL | Age: 68
Setting detail: RECURRING SERIES
End: 2023-06-29
Payer: MEDICARE

## 2023-06-29 PROCEDURE — 97110 THERAPEUTIC EXERCISES: CPT

## 2023-06-29 PROCEDURE — 97161 PT EVAL LOW COMPLEX 20 MIN: CPT

## 2023-07-11 ENCOUNTER — HOSPITAL ENCOUNTER (OUTPATIENT)
Facility: HOSPITAL | Age: 68
Setting detail: RECURRING SERIES
Discharge: HOME | End: 2023-07-14
Payer: MEDICARE

## 2023-07-11 PROCEDURE — 97140 MANUAL THERAPY 1/> REGIONS: CPT

## 2023-07-11 PROCEDURE — 97110 THERAPEUTIC EXERCISES: CPT

## 2023-07-11 NOTE — PROGRESS NOTES
PHYSICAL / OCCUPATIONAL THERAPY - DAILY TREATMENT NOTE (updated )    Patient Name: Zhao Hagan    Date: 2023    : 1955  Insurance: Payor: MEDICARE / Plan: MEDICARE PART A AND B / Product Type: *No Product type* /      Patient  verified Yes     Visit #   Current / Total 2 10   Time   In / Out 8:28 9:08   Pain   In / Out 0 0   Subjective Functional Status/Changes: Saw PA 23  Started driving yesterday. RTW 23     TREATMENT AREA =  Shoulder pain, left [M25.512]    OBJECTIVE    Modalities Rationale:     decrease edema, decrease inflammation, decrease pain, and increase tissue extensibility to improve patient's ability to progress to PLOF and address remaining functional goals. min [] Estim Unattended, type/location:                                      []  w/ice    []  w/heat    min [] Estim Attended, type/location:                                     []  w/US     []  w/ice    []  w/heat    []  TENS insruct      min []  Mechanical Traction: type/lbs                   []  pro   []  sup   []  int   []  cont    []  before manual    []  after manual    min []  Ultrasound, settings/location:      min []  Iontophoresis w/ dexamethasone, location:                                               []  take home patch       []  in clinic   10 min  unbill [x]  Ice     []  Heat    location/position: Supine Left reclined Left shoulder    min []  Paraffin,  details:     min []  Vasopneumatic Device, press/temp:     min []  Saravanan Lacer / Norberto Dayhoff: If using vaso (only need to measure limb vaso being performed on)      pre-treatment girth :       post-treatment girth :       measured at (landmark location) :      min []  Other:    Skin assessment post-treatment (if applicable):    [x]  intact    []  redness- no adverse reaction                 []redness - adverse reaction:         Therapeutic Procedures:     Tx Min Billable or 1:1 Min (if diff from Tx Min) Procedure, Rationale, Specifics   16  [id number]

## 2023-07-13 ENCOUNTER — HOSPITAL ENCOUNTER (OUTPATIENT)
Facility: HOSPITAL | Age: 68
Setting detail: RECURRING SERIES
Discharge: HOME | End: 2023-07-16
Payer: MEDICARE

## 2023-07-13 PROCEDURE — 97110 THERAPEUTIC EXERCISES: CPT

## 2023-07-13 PROCEDURE — 97140 MANUAL THERAPY 1/> REGIONS: CPT

## 2023-07-18 ENCOUNTER — HOSPITAL ENCOUNTER (OUTPATIENT)
Facility: HOSPITAL | Age: 68
Setting detail: RECURRING SERIES
Discharge: HOME | End: 2023-07-21
Payer: MEDICARE

## 2023-07-18 PROCEDURE — 97110 THERAPEUTIC EXERCISES: CPT

## 2023-07-18 PROCEDURE — 97140 MANUAL THERAPY 1/> REGIONS: CPT

## 2023-07-18 NOTE — PROGRESS NOTES
PHYSICAL / OCCUPATIONAL THERAPY - DAILY TREATMENT NOTE (updated )    Patient Name: Lamberto Lagos    Date: 2023    : 1955  Insurance: Payor: MEDICARE / Plan: MEDICARE PART A AND B / Product Type: *No Product type* /      Patient  verified Yes     Visit #   Current / Total 4 10   Time   In / Out 820 [id number]   Pain   In / Out 0 0   Subjective Functional Status/Changes: Not having any pain really. More achiness. TREATMENT AREA =  Shoulder pain, left [M25.512]    OBJECTIVE    Modalities Rationale:     decrease edema, decrease inflammation, decrease pain, and increase tissue extensibility to improve patient's ability to progress to PLOF and address remaining functional goals. min [] Estim Unattended, type/location:                                      []  w/ice    []  w/heat    min [] Estim Attended, type/location:                                     []  w/US     []  w/ice    []  w/heat    []  TENS insruct      min []  Mechanical Traction: type/lbs                   []  pro   []  sup   []  int   []  cont    []  before manual    []  after manual    min []  Ultrasound, settings/location:      min []  Iontophoresis w/ dexamethasone, location:                                               []  take home patch       []  in clinic   10 min  unbill [x]  Ice     []  Heat    location/position: Supine Left reclined Left shoulder    min []  Paraffin,  details:     min []  Vasopneumatic Device, press/temp:     min []  Roosevelt Law / Kathya Railing: If using vaso (only need to measure limb vaso being performed on)      pre-treatment girth :       post-treatment girth :       measured at (landmark location) :      min []  Other:    Skin assessment post-treatment (if applicable):    [x]  intact    []  redness- no adverse reaction                 []redness - adverse reaction:         Therapeutic Procedures:     Tx Min Billable or 1:1 Min (if diff from Tx Min) Procedure, Rationale, Specifics   20  [id number] Therapeutic

## 2023-07-21 ENCOUNTER — HOSPITAL ENCOUNTER (OUTPATIENT)
Facility: HOSPITAL | Age: 68
Setting detail: RECURRING SERIES
Discharge: HOME | End: 2023-07-24
Payer: MEDICARE

## 2023-07-21 PROCEDURE — 97535 SELF CARE MNGMENT TRAINING: CPT

## 2023-07-21 PROCEDURE — 97110 THERAPEUTIC EXERCISES: CPT

## 2023-07-21 PROCEDURE — 97140 MANUAL THERAPY 1/> REGIONS: CPT

## 2023-07-21 NOTE — PROGRESS NOTES
address functional mobility deficits, analyze and address ROM deficits, analyze and address soft tissue restrictions, and instruct in home and community integration to address functional deficits and attain remaining goals. Progress toward goals / Updated goals:  []  See Progress Note/Recertification    Short Term Goals: To be accomplished in 2 treatments   Pt will report compliance and independence to [facility] to help the pt manage their pain and symptoms. Status at last note/certification:  Progressing 7/18/23  Long Term Goals: To be accomplished in 10 treatments  Pt will increase FOTO score by 5 points to improve ability to perform ADLs. Status at last note/certification[de-identified] 27 points total  2. Pt will report an improvement in at worst pain to 4/10 to improve ability to sleeping. Status at last note/certification: 7/69  3. Pt will increase PROM left shoulder flex to 140 degs, ABD to 120 degs, ER to WNL in a neutral plane to improve ability to progress through protocol. Status at last note/certification: flex 571 degs, ABD 83 degs, ER 62 degs in a neutral plane  4. Pt will report independence with sling donning/doffing and with dressing to improve independence with IADLs.   Status at last note/certification: needs assistance from      Next PN/ RC due 7-29-23    PLAN  Yes  Continue plan of care  [x]  Upgrade activities as tolerated  []  Discharge due to :  []  Other:    Saleem Dow PTA    7/21/2023    6:18 AM    Future Appointments   Date Time Provider [id number]   7/21/2023  8:20 AM Saleem Dow [facility]   7/25/2023  9:00 AM Saleem Dow PTA [facility]   7/27/2023  9:00 AM Saleem Dow PTA [facility]   7/31/2023  9:00 AM Kamila Phillips [facility]   8/3/2023  8:20 AM [facility]

## 2023-07-25 ENCOUNTER — HOSPITAL ENCOUNTER (OUTPATIENT)
Facility: HOSPITAL | Age: 68
Setting detail: RECURRING SERIES
Discharge: HOME | End: 2023-07-28
Payer: MEDICARE

## 2023-07-25 PROCEDURE — 97110 THERAPEUTIC EXERCISES: CPT

## 2023-07-25 PROCEDURE — 97140 MANUAL THERAPY 1/> REGIONS: CPT

## 2023-07-25 NOTE — PROGRESS NOTES
[facility]   8/24/2023  8:20 AM Joel Moore PTA [facility]   8/28/2023  8:20 AM Joel Moore PTA [facility]   8/31/2023  8:20 AM Joel Moore PTA [facility]

## 2023-07-27 ENCOUNTER — HOSPITAL ENCOUNTER (OUTPATIENT)
Facility: HOSPITAL | Age: 68
Setting detail: RECURRING SERIES
Discharge: HOME | End: 2023-07-30
Payer: MEDICARE

## 2023-07-27 PROCEDURE — 97140 MANUAL THERAPY 1/> REGIONS: CPT

## 2023-07-27 PROCEDURE — 97110 THERAPEUTIC EXERCISES: CPT

## 2023-07-27 NOTE — PROGRESS NOTES
Rationale, Specifics   20 20 [id number] Therapeutic Exercise (timed):  increase ROM, strength, coordination, balance, and proprioception to improve patient's ability to progress to PLOF and address remaining functional goals. (see flow sheet as applicable)     Details if applicable:       15 15 70008 Manual Therapy (timed):  decrease pain, increase ROM, increase tissue extensibility, and decrease edema to improve patient's ability to progress to PLOF and address remaining functional goals. The manual therapy interventions were performed at a separate and distinct time from the therapeutic activities interventions . (see flow sheet as applicable)     Details if applicable:  semi reclined gentle PROM per protocol           Details if applicable:            Details if applicable:        38879 Self Care/Home Management (timed):  improve patient knowledge and understanding of pain reducing techniques, positioning, activity modification, and    to improve patient's ability to progress to PLOF and address remaining functional goals. (see flow sheet as applicable)       Details if applicable:  review post op with model and Netters. 33 27 [facility] Totals Reminder: bill using total billable min of TIMED therapeutic procedures (example: do not include dry needle or estim unattended, both untimed codes, in totals to left)  8-22 min = 1 unit; 23-37 min = 2 units; 38-52 min = 3 units; 53-67 min = 4 units; 68-82 min = 5 units   Total Total     [x]  Patient Education billed concurrently with other procedures   [x] Review HEP    [] Progressed/Changed HEP, detail:    [] Other detail:       Objective Information/Functional Measures/Assessment    Continues with good PROM of shoulder.       Patient will continue to benefit from skilled PT / OT services to modify and progress therapeutic interventions, analyze and address functional mobility deficits, analyze and address ROM deficits, analyze and address soft tissue restrictions, and instruct

## 2023-07-31 ENCOUNTER — APPOINTMENT (OUTPATIENT)
Facility: HOSPITAL | Age: 68
End: 2023-07-31
Payer: MEDICARE

## 2023-08-01 ENCOUNTER — HOSPITAL ENCOUNTER (OUTPATIENT)
Facility: HOSPITAL | Age: 68
Setting detail: RECURRING SERIES
Discharge: HOME | End: 2023-08-04
Payer: MEDICARE

## 2023-08-01 PROCEDURE — 97110 THERAPEUTIC EXERCISES: CPT

## 2023-08-01 PROCEDURE — 97535 SELF CARE MNGMENT TRAINING: CPT

## 2023-08-01 PROCEDURE — 97140 MANUAL THERAPY 1/> REGIONS: CPT

## 2023-08-01 NOTE — THERAPY RECERTIFICATION
shoulder but her FOTO score improved. She remains in a sling. We will plan on continuing therapy per protocol to improve independence and mobility. If you have any questions/comments please contact us directly at [phone]. Thank you for allowing us to assist in the care of your patient. Certification Period: 7/27/2023-10/24/2023  Reporting Period (date from last assessment to current assessment): 6/29/2023-7/27/2023    Renetta Sanford, PT       8/1/2023       5:34 PM      ___ I have read the above report and request that my patient continue as recommended.   ___ I have read the above report and request that my patient continue therapy with the following changes/special instructions: ________________________________________________   ___ I have read the above report and request that my patient be discharged from therapy. Physician's Signature:_________________________   DATE:_________   TIME:________                           Herchel List    ** Signature, Date and Time must be completed for valid certification **  Please sign and fax to [address] [phone].   Thank you

## 2023-08-01 NOTE — PROGRESS NOTES
Procedure, Rationale, Specifics   10 10 51579 Therapeutic Exercise (timed):  increase ROM, strength, coordination, balance, and proprioception to improve patient's ability to progress to PLOF and address remaining functional goals. (see flow sheet as applicable)     Details if applicable:       15 15 43691 Manual Therapy (timed):  decrease pain, increase ROM, increase tissue extensibility, and decrease edema to improve patient's ability to progress to PLOF and address remaining functional goals. The manual therapy interventions were performed at a separate and distinct time from the therapeutic activities interventions . (see flow sheet as applicable)     Details if applicable:  semi reclined gentle PROM per protocol           Details if applicable:            Details if applicable:     15 15  [id number] Self Care/Home Management (timed):  improve patient knowledge and understanding of pain reducing techniques, positioning, activity modification, and    to improve patient's ability to progress to PLOF and address remaining functional goals. (see flow sheet as applicable)       Details if applicable:  review post op with model and Netters. 36 36 [facility] Totals Reminder: bill using total billable min of TIMED therapeutic procedures (example: do not include dry needle or estim unattended, both untimed codes, in totals to left)  8-22 min = 1 unit; 23-37 min = 2 units; 38-52 min = 3 units; 53-67 min = 4 units; 68-82 min = 5 units   Total Total     [x]  Patient Education billed concurrently with other procedures   [x] Review HEP    [] Progressed/Changed HEP, detail:    [] Other detail:       Objective Information/Functional Measures/Assessment    GROC: 0 no change    FOTO: 28    Patient reports max pain 2/10, least pain 0/10, average pain 0/10. Patient reports good reduction of original symptoms. Pt is independence with sling donning/doffing and with dressing to improve independence with IADLs.     Patient will continue

## 2023-08-03 ENCOUNTER — HOSPITAL ENCOUNTER (OUTPATIENT)
Facility: HOSPITAL | Age: 68
Setting detail: RECURRING SERIES
Discharge: HOME | End: 2023-08-06
Payer: MEDICARE

## 2023-08-03 PROCEDURE — 97140 MANUAL THERAPY 1/> REGIONS: CPT

## 2023-08-03 PROCEDURE — 97110 THERAPEUTIC EXERCISES: CPT

## 2023-08-03 NOTE — PROGRESS NOTES
PHYSICAL / OCCUPATIONAL THERAPY - DAILY TREATMENT NOTE (updated )    Patient Name: Rocio Prieto    Date: 8/3/2023    : 1955  Insurance: Payor: MEDICARE / Plan: MEDICARE PART A AND B / Product Type: *No Product type* /      Patient  verified Yes     Visit #   Current / Total 9 18   Time   In / Out 820 910   Pain   In / Out 0 0   Subjective Functional Status/Changes: No pain in the shoulder. TREATMENT AREA =  Shoulder pain, left [M25.512]    OBJECTIVE    Modalities Rationale:     decrease edema, decrease inflammation, decrease pain, and increase tissue extensibility to improve patient's ability to progress to PLOF and address remaining functional goals. min [] Estim Unattended, type/location:                                      []  w/ice    []  w/heat    min [] Estim Attended, type/location:                                     []  w/US     []  w/ice    []  w/heat    []  TENS insruct      min []  Mechanical Traction: type/lbs                   []  pro   []  sup   []  int   []  cont    []  before manual    []  after manual    min []  Ultrasound, settings/location:      min []  Iontophoresis w/ dexamethasone, location:                                               []  take home patch       []  in clinic   10/10 min  unbill [x]  Ice     [x]  Heat    location/position: Supine Left reclined Left shoulder MHP pre, ice post.    min []  Paraffin,  details:     min []  Vasopneumatic Device, press/temp:     min []  Danial Pouch / Jeanie Gobble: If using vaso (only need to measure limb vaso being performed on)      pre-treatment girth :       post-treatment girth :       measured at (landmark location) :      min []  Other:    Skin assessment post-treatment (if applicable):    [x]  intact    []  redness- no adverse reaction                 []redness - adverse reaction:         Therapeutic Procedures:     Tx Min Billable or 1:1 Min (if diff from Tx Min) Procedure, Rationale, Specifics   27 47 [id number] Therapeutic

## 2023-08-07 ENCOUNTER — HOSPITAL ENCOUNTER (OUTPATIENT)
Facility: HOSPITAL | Age: 68
Setting detail: RECURRING SERIES
Discharge: HOME | End: 2023-08-10
Payer: MEDICARE

## 2023-08-07 PROCEDURE — 97140 MANUAL THERAPY 1/> REGIONS: CPT

## 2023-08-07 PROCEDURE — 97110 THERAPEUTIC EXERCISES: CPT

## 2023-08-07 NOTE — PROGRESS NOTES
PHYSICAL / OCCUPATIONAL THERAPY - DAILY TREATMENT NOTE (updated )    Patient Name: Lamberto Lagos    Date: 2023    : 1955  Insurance: Payor: MEDICARE / Plan: MEDICARE PART A AND B / Product Type: *No Product type* /      Patient  verified Yes     Visit #   Current / Total 10 18   Time   In / Out 820 920   Pain   In / Out 0 1   Subjective Functional Status/Changes: I had to work this past weekend, but the arm was fine. TREATMENT AREA =  Shoulder pain, left [M25.512]    OBJECTIVE    Modalities Rationale:     decrease edema, decrease inflammation, decrease pain, and increase tissue extensibility to improve patient's ability to progress to PLOF and address remaining functional goals. min [] Estim Unattended, type/location:                                      []  w/ice    []  w/heat    min [] Estim Attended, type/location:                                     []  w/US     []  w/ice    []  w/heat    []  TENS insruct      min []  Mechanical Traction: type/lbs                   []  pro   []  sup   []  int   []  cont    []  before manual    []  after manual    min []  Ultrasound, settings/location:      min []  Iontophoresis w/ dexamethasone, location:                                               []  take home patch       []  in clinic   10/10 min  unbill [x]  Ice     [x]  Heat    location/position: Supine Left reclined Left shoulder MHP pre, ice post.    min []  Paraffin,  details:     min []  Vasopneumatic Device, press/temp:     min []  Roosevelt Law / Kathya Railing: If using vaso (only need to measure limb vaso being performed on)      pre-treatment girth :       post-treatment girth :       measured at (landmark location) :      min []  Other:    Skin assessment post-treatment (if applicable):    [x]  intact    []  redness- no adverse reaction                 []redness - adverse reaction:         Therapeutic Procedures:     Tx Min Billable or 1:1 Min (if diff from [employer]) Procedure, Rationale,

## 2023-08-11 ENCOUNTER — HOSPITAL ENCOUNTER (OUTPATIENT)
Facility: HOSPITAL | Age: 68
Setting detail: RECURRING SERIES
Discharge: HOME | End: 2023-08-14
Payer: MEDICARE

## 2023-08-11 PROCEDURE — 97110 THERAPEUTIC EXERCISES: CPT

## 2023-08-11 PROCEDURE — 97140 MANUAL THERAPY 1/> REGIONS: CPT

## 2023-08-11 NOTE — PROGRESS NOTES
PHYSICAL / OCCUPATIONAL THERAPY - DAILY TREATMENT NOTE (updated )    Patient Name: Carrie Quigley    Date: 2023    : 1955  Insurance: Payor: MEDICARE / Plan: MEDICARE PART A AND B / Product Type: *No Product type* /      Patient  verified Yes     Visit #   Current / Total 11 18   Time   In / Out 825 915   Pain   In / Out 0 1   Subjective Functional Status/Changes: No new c/o     TREATMENT AREA =  Shoulder pain, left [M25.512]    OBJECTIVE    Modalities Rationale:     decrease edema, decrease inflammation, decrease pain, and increase tissue extensibility to improve patient's ability to progress to PLOF and address remaining functional goals. min [] Estim Unattended, type/location:                                      []  w/ice    []  w/heat    min [] Estim Attended, type/location:                                     []  w/US     []  w/ice    []  w/heat    []  TENS insruct      min []  Mechanical Traction: type/lbs                   []  pro   []  sup   []  int   []  cont    []  before manual    []  after manual    min []  Ultrasound, settings/location:      min []  Iontophoresis w/ dexamethasone, location:                                               []  take home patch       []  in clinic   10/10 min  unbill [x]  Ice     [x]  Heat    location/position: Supine Left reclined Left shoulder MHP pre, ice post.    min []  Paraffin,  details:     min []  Vasopneumatic Device, press/temp:     min []  Dong Bennett / Delilah Lanza: If using vaso (only need to measure limb vaso being performed on)      pre-treatment girth :       post-treatment girth :       measured at (landmark location) :      min []  Other:    Skin assessment post-treatment (if applicable):    [x]  intact    []  redness- no adverse reaction                 []redness - adverse reaction:         Therapeutic Procedures:     Tx Min Billable or 1:1 Min (if diff from Tx Min) Procedure, Rationale, Specifics   10 29 [id number] Therapeutic Exercise

## 2023-08-14 ENCOUNTER — HOSPITAL ENCOUNTER (OUTPATIENT)
Facility: HOSPITAL | Age: 68
Setting detail: RECURRING SERIES
Discharge: HOME | End: 2023-08-17
Payer: MEDICARE

## 2023-08-14 PROCEDURE — 97140 MANUAL THERAPY 1/> REGIONS: CPT

## 2023-08-14 PROCEDURE — 97110 THERAPEUTIC EXERCISES: CPT

## 2023-08-14 NOTE — PROGRESS NOTES
PHYSICAL / OCCUPATIONAL THERAPY - DAILY TREATMENT NOTE (updated )    Patient Name: Maris Wynne    Date: 2023    : 1955  Insurance: Payor: MEDICARE / Plan: MEDICARE PART A AND B / Product Type: *No Product type* /      Patient  verified Yes     Visit #   Current / Total 12 18   Time   In / Out 825 930   Pain   In / Out 0 1   Subjective Functional Status/Changes: No new c/o, no problems over the weekend. Seeing MD today. TREATMENT AREA =  Shoulder pain, left [M25.512]    OBJECTIVE    Modalities Rationale:     decrease edema, decrease inflammation, decrease pain, and increase tissue extensibility to improve patient's ability to progress to PLOF and address remaining functional goals. min [] Estim Unattended, type/location:                                      []  w/ice    []  w/heat    min [] Estim Attended, type/location:                                     []  w/US     []  w/ice    []  w/heat    []  TENS insruct      min []  Mechanical Traction: type/lbs                   []  pro   []  sup   []  int   []  cont    []  before manual    []  after manual    min []  Ultrasound, settings/location:      min []  Iontophoresis w/ dexamethasone, location:                                               []  take home patch       []  in clinic   10 min  unbill [x]  Ice     [x]  Heat    location/position: Supine Left reclined Left shoulder  ice post.    min []  Paraffin,  details:     min []  Vasopneumatic Device, press/temp:     min []  Trell Reason / Joanne : If using vaso (only need to measure limb vaso being performed on)      pre-treatment girth :       post-treatment girth :       measured at (landmark location) :      min []  Other:    Skin assessment post-treatment (if applicable):    [x]  intact    []  redness- no adverse reaction                 []redness - adverse reaction:         Therapeutic Procedures:     Tx Min Billable or 1:1 Min (if diff from [employer]) Procedure, Rationale, Specifics

## 2023-08-17 ENCOUNTER — HOSPITAL ENCOUNTER (OUTPATIENT)
Facility: HOSPITAL | Age: 68
Setting detail: RECURRING SERIES
Discharge: HOME | End: 2023-08-20
Payer: MEDICARE

## 2023-08-17 PROCEDURE — 97140 MANUAL THERAPY 1/> REGIONS: CPT

## 2023-08-17 PROCEDURE — 97110 THERAPEUTIC EXERCISES: CPT

## 2023-08-17 NOTE — PROGRESS NOTES
PHYSICAL / OCCUPATIONAL THERAPY - DAILY TREATMENT NOTE (updated )    Patient Name: Felisa Melchor    Date: 2023    : 1955  Insurance: Payor: MEDICARE / Plan: MEDICARE PART A AND B / Product Type: *No Product type* /      Patient  verified Yes     Visit #   Current / Total 13 18   Time   In / Out 825 915   Pain   In / Out 0 1   Subjective Functional Status/Changes: I am free from the sling. TREATMENT AREA =  Shoulder pain, left [M25.512]    OBJECTIVE    Modalities Rationale:     decrease edema, decrease inflammation, decrease pain, and increase tissue extensibility to improve patient's ability to progress to PLOF and address remaining functional goals. min [] Estim Unattended, type/location:                                      []  w/ice    []  w/heat    min [] Estim Attended, type/location:                                     []  w/US     []  w/ice    []  w/heat    []  TENS insruct      min []  Mechanical Traction: type/lbs                   []  pro   []  sup   []  int   []  cont    []  before manual    []  after manual    min []  Ultrasound, settings/location:      min []  Iontophoresis w/ dexamethasone, location:                                               []  take home patch       []  in clinic   10 min  unbill [x]  Ice     [x]  Heat    location/position: Supine Left reclined Left shoulder  ice post.    min []  Paraffin,  details:     min []  Vasopneumatic Device, press/temp:     min []  Javon Brandon / Dellia Pepito: If using vaso (only need to measure limb vaso being performed on)      pre-treatment girth :       post-treatment girth :       measured at (landmark location) :      min []  Other:    Skin assessment post-treatment (if applicable):    [x]  intact    []  redness- no adverse reaction                 []redness - adverse reaction:         Therapeutic Procedures:     Tx Min Billable or 1:1 Min (if diff from Tx Min) Procedure, Rationale, Specifics   32 68 [id number] Therapeutic Exercise

## 2023-08-21 ENCOUNTER — HOSPITAL ENCOUNTER (OUTPATIENT)
Facility: HOSPITAL | Age: 68
Setting detail: RECURRING SERIES
Discharge: HOME | End: 2023-08-24
Payer: MEDICARE

## 2023-08-21 PROCEDURE — 97140 MANUAL THERAPY 1/> REGIONS: CPT

## 2023-08-21 PROCEDURE — 97110 THERAPEUTIC EXERCISES: CPT

## 2023-08-21 PROCEDURE — 97112 NEUROMUSCULAR REEDUCATION: CPT

## 2023-08-21 NOTE — PROGRESS NOTES
PHYSICAL / OCCUPATIONAL THERAPY - DAILY TREATMENT NOTE (updated )    Patient Name: Sonia Sears    Date: 2023    : 1955  Insurance: Payor: MEDICARE / Plan: MEDICARE PART A AND B / Product Type: *No Product type* /      Patient  verified Yes     Visit #   Current / Total 14 18   Time   In / Out 825 925   Pain   In / Out 0 1   Subjective Functional Status/Changes:  Had a little extra pain but nothing bad. Did  HEP and had no problems with them. TREATMENT AREA =  Shoulder pain, left [M25.512]    OBJECTIVE    Modalities Rationale:     decrease edema, decrease inflammation, decrease pain, and increase tissue extensibility to improve patient's ability to progress to PLOF and address remaining functional goals. min [] Estim Unattended, type/location:                                      []  w/ice    []  w/heat    min [] Estim Attended, type/location:                                     []  w/US     []  w/ice    []  w/heat    []  TENS insruct      min []  Mechanical Traction: type/lbs                   []  pro   []  sup   []  int   []  cont    []  before manual    []  after manual    min []  Ultrasound, settings/location:      min []  Iontophoresis w/ dexamethasone, location:                                               []  take home patch       []  in clinic   10 min  unbill [x]  Ice     [x]  Heat    location/position: Supine Left reclined Left shoulder  ice post.    min []  Paraffin,  details:     min []  Vasopneumatic Device, press/temp:     min []  Natali Hansen / Guerita Criselda: If using vaso (only need to measure limb vaso being performed on)      pre-treatment girth :       post-treatment girth :       measured at (landmark location) :      min []  Other:    Skin assessment post-treatment (if applicable):    [x]  intact    []  redness- no adverse reaction                 []redness - adverse reaction:         Therapeutic Procedures:     Tx Min Billable or 1:1 Min (if diff from [employer])

## 2023-08-24 ENCOUNTER — HOSPITAL ENCOUNTER (OUTPATIENT)
Facility: HOSPITAL | Age: 68
Setting detail: RECURRING SERIES
Discharge: HOME | End: 2023-08-27
Payer: MEDICARE

## 2023-08-24 PROCEDURE — 97140 MANUAL THERAPY 1/> REGIONS: CPT

## 2023-08-24 PROCEDURE — 97110 THERAPEUTIC EXERCISES: CPT

## 2023-08-24 NOTE — PROGRESS NOTES
[address] [facility]  [address] [place] [address], [address] [place] [address] Kit Mcmahan, [address] Ph: [phone] Fx: [phone]  PHYSICAL THERAPY PROGRESS NOTE  Patient Name: Mark Villagomez : 1955   Treatment/Medical Diagnosis: Shoulder pain, left [M25.512]   Referral Source: Jessie Suh*     Date of Initial Visit: 2023 Attended Visits: 15 Missed Visits: 0     SUMMARY OF TREATMENT:    Patient seen in PT for Initial Evaluation  and for 14 additional PT follow-up treatment sessions from 23 to 2023. PT treatments consisting of Therapeutic Exercise/Therapeutic Activity, Manual Therapy, Patient Education/HEP Instruction to increase left shoulder ROM; and MHP/CP for pain control. Protocol recently allowed isometric strengthening with arm at side  and that has begun; Resistive training to begin at ~12 weeks post-op, as allowed by protocol. CURRENT STATUS:    FOTO:  50. Left Shoulder AROm (standing):  Flex 137 deg; Ext 53 deg; Abduction 135 with some movement into scaption near end range; Behind Head Reach to ~T2-3 and right to ~T4-5, left , right by ~1\"; Cross Body Reach ~WFL with finger tips nearly to right middle scapula and OK; Behind Back Reach to ~T11, right to ~T6-7, and left < right by ~5.5\". Left Shoulder PROM (supine):  Flex 160 deg with end range discomfort; Abduction full motion with mild scaption near end range; ER (@90 scaption) 86 deg with end range discomfort; IE (@90 scaption) 80 deg with mild end range discomfort.      Functional Gains: can reach up to shelves for items, wash hair with left hand, wash right side of body with left hand  Functional Deficits: weak for dog pulling at leash, can't reach up to high shelves, can't hook bra, not gardening.  % improvement: 50%  Pain   Average: 0/10                  Best: 0/10                Worst: 0.5/10  Patient Goal: \"able to resist dog pull, fasten bra, reach up to high shelves,
remaining goals. Progress toward goals / Updated goals:  [x]  See Progress Note/Recertification    Goals for this certification period include and are to be achieved in   24  treatments:  Pt will increase FOTO score by 20 points to improve ability to perform ADLs. Status at last note/certification: 27 points total  Progressin.  2. Pt will increase PROM left shoulder flex to 150 degs, ABD to 140 degs, ER to WNL in a neutral plane to improve ability to progress through protocol. Status at last note/certification: Flexion: 90 degrees, ER @ 45 degrees: 40 degrees, Abd: 60 degrees  Progressing:  Flex 160 deg with end range discomfort; Abduction full motion with mild scaption near end range; ER (@90 scaption) 86 deg with end range discomfort; IE (@90 scaption) 80 deg with mild end range discomfort. 3. Pt will increase AROM left shoulder flex/ABD to at least 130 degs to improve ease of reaching when cleared to perform AROM. Status at last note/certification: not cleared for AROM yet  Progressing:  Flex 137 deg; Ext 53 deg; Abduction 135 with some movement into scaption near end range; Behind Head Reach to ~T2-3 and right to ~T4-5, left , right by ~1\"; Cross Body Reach ~WFL with finger tips nearly to right middle scapula and OK; Behind Back Reach to ~T11, right to ~T6-7, and left < right by ~5.5\". 4. Pt will be able to reach to a shoulder height shelf with a 2lb weight with no difficulty to improve ease of daily tasks and independence. Status at last note/certification: unable due to restrictions  Progressing:  improving overhead reach, but no weight/resistance yet.     Next PN/ RC due 2023  Auth due 2023    PLAN  Yes  Continue plan of care  [x]  Upgrade activities as tolerated  []  Discharge due to :  []  Other:    Rut Lozoya PT    2023    8:29 AM    Future Appointments   Date Time Provider [address] [place] [address]   2023 11:00 AM Rut Lozoya PT [facility]   2023  8:20 AM

## 2023-08-28 ENCOUNTER — HOSPITAL ENCOUNTER (OUTPATIENT)
Facility: HOSPITAL | Age: 68
Setting detail: RECURRING SERIES
Discharge: HOME | End: 2023-08-31
Payer: MEDICARE

## 2023-08-28 PROCEDURE — 97110 THERAPEUTIC EXERCISES: CPT

## 2023-08-28 PROCEDURE — 97112 NEUROMUSCULAR REEDUCATION: CPT

## 2023-08-28 NOTE — PROGRESS NOTES
PHYSICAL / OCCUPATIONAL THERAPY - DAILY TREATMENT NOTE (updated )    Patient Name: Zhao Hagan    Date: 2023    : 1955  Insurance: Payor: MEDICARE / Plan: MEDICARE PART A AND B / Product Type: *No Product type* /      Patient  verified Yes     Visit #   Current / Total 16 24   Time   In / Out 8:23 9:13   Pain   In / Out 0/10 0/10   Subjective Functional Status/Changes: OK after last PT session. No current shoulder pain. TREATMENT AREA =  Shoulder pain, left [M25.512]    OBJECTIVE    Modalities Rationale:     decrease edema, decrease inflammation, decrease pain, and increase tissue extensibility to improve patient's ability to progress to PLOF and address remaining functional goals. min [] Estim Unattended, type/location:                                                 []  w/ice    []  w/heat     min [] Estim Attended, type/location:                                                []  w/US     []  w/ice    []  w/heat    []  TENS insruct       min []  Mechanical Traction: type/lbs                    []  pro   []  sup   []  int   []  cont    []  before manual    []  after manual     min []  Ultrasound, settings/location:        min []  Iontophoresis w/ dexamethasone, location:                                                []  take home patch       []  in clinic   10 min  unbill [x]  Ice     []  Heat    location/position:  Left shoulder post-Tx/seated with left UE supported. min []  Paraffin,  details:       min []  Vasopneumatic Device, press/temp:       min []  Saravanan Bautistaer / Norberto Dayhoff: If using vaso (only need to measure limb vaso being performed on)      pre-treatment girth :       post-treatment girth :       measured at (landmark location) :       min []  Other:     Skin assessment post-treatment (if applicable):    [x]  intact    []  redness- no adverse reaction                 []redness - adverse reaction:         Therapeutic Procedures:     Tx Min

## 2023-08-31 ENCOUNTER — HOSPITAL ENCOUNTER (OUTPATIENT)
Facility: HOSPITAL | Age: 68
Setting detail: RECURRING SERIES
End: 2023-08-31
Payer: MEDICARE

## 2023-08-31 PROCEDURE — 97140 MANUAL THERAPY 1/> REGIONS: CPT

## 2023-08-31 PROCEDURE — 97112 NEUROMUSCULAR REEDUCATION: CPT

## 2023-08-31 PROCEDURE — 97110 THERAPEUTIC EXERCISES: CPT

## 2023-08-31 NOTE — PROGRESS NOTES
PHYSICAL / OCCUPATIONAL THERAPY - DAILY TREATMENT NOTE (updated )    Patient Name: Anastasia Ortiz    Date: 2023    : 1955  Insurance: Payor: MEDICARE / Plan: MEDICARE PART A AND B / Product Type: *No Product type* /      Patient  verified Yes     Visit #   Current / Total 17 26   Time   In / Out 825 925   Pain   In / Out 0 1   Subjective Functional Status/Changes: I fell last Tuesday but I did not land on my arm. TREATMENT AREA =  Shoulder pain, left [M25.512]    OBJECTIVE    Modalities Rationale:     decrease edema, decrease inflammation, decrease pain, and increase tissue extensibility to improve patient's ability to progress to PLOF and address remaining functional goals. min [] Estim Unattended, type/location:                                      []  w/ice    []  w/heat    min [] Estim Attended, type/location:                                     []  w/US     []  w/ice    []  w/heat    []  TENS insruct      min []  Mechanical Traction: type/lbs                   []  pro   []  sup   []  int   []  cont    []  before manual    []  after manual    min []  Ultrasound, settings/location:      min []  Iontophoresis w/ dexamethasone, location:                                               []  take home patch       []  in clinic   10 min  unbill [x]  Ice     [x]  Heat    location/position: Supine Left reclined Left shoulder  ice post.    min []  Paraffin,  details:     min []  Vasopneumatic Device, press/temp:     min []  Delmy Contreras / Nela Acre: If using vaso (only need to measure limb vaso being performed on)      pre-treatment girth :       post-treatment girth :       measured at (landmark location) :      min []  Other:    Skin assessment post-treatment (if applicable):    [x]  intact    []  redness- no adverse reaction                 []redness - adverse reaction:         Therapeutic Procedures:     Tx Min Billable or 1:1 Min (if diff from Tx Min) Procedure, Rationale, Specifics   15 15

## 2023-09-05 ENCOUNTER — HOSPITAL ENCOUNTER (OUTPATIENT)
Facility: HOSPITAL | Age: 68
Setting detail: RECURRING SERIES
Discharge: HOME | End: 2023-09-08
Payer: MEDICARE

## 2023-09-05 PROCEDURE — 97110 THERAPEUTIC EXERCISES: CPT

## 2023-09-05 PROCEDURE — 97530 THERAPEUTIC ACTIVITIES: CPT

## 2023-09-05 NOTE — PROGRESS NOTES
PHYSICAL / OCCUPATIONAL THERAPY - DAILY TREATMENT NOTE (updated )    Patient Name: Ronit Christiansen    Date: 2023    : 1955  Insurance: Payor: MEDICARE / Plan: MEDICARE PART A AND B / Product Type: *No Product type* /      Patient  verified Yes     Visit #   Current / Total 18 24   Time   In / Out 10:23 11:17   Pain   In / Out 0/10 0/10   Subjective Functional Status/Changes: Pt reports left shoulder doing well. TREATMENT AREA =  Shoulder pain, left [M25.512]    OBJECTIVE    Modalities Rationale:     decrease edema, decrease inflammation, decrease pain, and increase tissue extensibility to improve patient's ability to progress to PLOF and address remaining functional goals. min [] Estim Unattended, type/location:                                                 []  w/ice    []  w/heat     min [] Estim Attended, type/location:                                                []  w/US     []  w/ice    []  w/heat    []  TENS insruct       min []  Mechanical Traction: type/lbs                    []  pro   []  sup   []  int   []  cont    []  before manual    []  after manual     min []  Ultrasound, settings/location:        min []  Iontophoresis w/ dexamethasone, location:                                                []  take home patch       []  in clinic   10 min  unbill [x]  Ice     []  Heat    location/position:  Left shoulder post-Tx/seated with left UE supported. min []  Paraffin,  details:       min []  Vasopneumatic Device, press/temp:       min []  Rachel Parkman / Jaylon Hammed: If using vaso (only need to measure limb vaso being performed on)      pre-treatment girth :       post-treatment girth :       measured at (landmark location) :       min []  Other:     Skin assessment post-treatment (if applicable):    [x]  intact    []  redness- no adverse reaction                 []redness - adverse reaction:          Therapeutic Procedures:     Tx Min Billable or 1:1 Min

## 2023-09-07 ENCOUNTER — HOSPITAL ENCOUNTER (OUTPATIENT)
Facility: HOSPITAL | Age: 68
Setting detail: RECURRING SERIES
Discharge: HOME | End: 2023-09-10
Payer: MEDICARE

## 2023-09-07 PROCEDURE — 97110 THERAPEUTIC EXERCISES: CPT

## 2023-09-07 PROCEDURE — 97140 MANUAL THERAPY 1/> REGIONS: CPT

## 2023-09-07 PROCEDURE — 97112 NEUROMUSCULAR REEDUCATION: CPT

## 2023-09-07 NOTE — PROGRESS NOTES
PHYSICAL / OCCUPATIONAL THERAPY - DAILY TREATMENT NOTE (updated )    Patient Name: Hamilton Ochoa    Date: 2023    : 1955  Insurance: Payor: MEDICARE / Plan: MEDICARE PART A AND B / Product Type: *No Product type* /      Patient  verified Yes     Visit #   Current / Total 19 26   Time   In / Out 905 1000   Pain   In / Out 0 0   Subjective Functional Status/Changes: Shou;lder feels a little sore at times. Jena Merino really works the exercises. TREATMENT AREA =  Shoulder pain, left [M25.512]    OBJECTIVE    Modalities Rationale:     decrease edema, decrease inflammation, decrease pain, and increase tissue extensibility to improve patient's ability to progress to PLOF and address remaining functional goals. min [] Estim Unattended, type/location:                                      []  w/ice    []  w/heat    min [] Estim Attended, type/location:                                     []  w/US     []  w/ice    []  w/heat    []  TENS insruct      min []  Mechanical Traction: type/lbs                   []  pro   []  sup   []  int   []  cont    []  before manual    []  after manual    min []  Ultrasound, settings/location:      min []  Iontophoresis w/ dexamethasone, location:                                               []  take home patch       []  in clinic   10 min  unbill [x]  Ice     []  Heat    location/position: Supine Left reclined Left shoulder  ice post.    min []  Paraffin,  details:     min []  Vasopneumatic Device, press/temp:     min []  Carvin Loots / Rodriguez Been: If using vaso (only need to measure limb vaso being performed on)      pre-treatment girth :       post-treatment girth :       measured at (landmark location) :      min []  Other:    Skin assessment post-treatment (if applicable):    [x]  intact    []  redness- no adverse reaction                 []redness - adverse reaction:         Therapeutic Procedures:     Tx Min Billable or 1:1 Min (if diff from [employer]) Procedure,

## 2023-09-11 ENCOUNTER — APPOINTMENT (OUTPATIENT)
Facility: HOSPITAL | Age: 68
End: 2023-09-11
Payer: MEDICARE

## 2023-09-12 ENCOUNTER — HOSPITAL ENCOUNTER (OUTPATIENT)
Facility: HOSPITAL | Age: 68
Setting detail: RECURRING SERIES
Discharge: HOME | End: 2023-09-15
Payer: MEDICARE

## 2023-09-12 PROCEDURE — 97112 NEUROMUSCULAR REEDUCATION: CPT

## 2023-09-12 PROCEDURE — 97110 THERAPEUTIC EXERCISES: CPT

## 2023-09-12 PROCEDURE — 97140 MANUAL THERAPY 1/> REGIONS: CPT

## 2023-09-12 NOTE — PROGRESS NOTES
Objective Information/Functional Measures/Assessment    Add standing shoulder flexion and scaption against wall with 1# x 10 each. Utilized mirror to decrease shoulder hike. Patient progressing steadily with all motions of shoulder. Slight tightness with shoulder flexion and scaption. Patient will continue to benefit from skilled PT / OT services to modify and progress therapeutic interventions, analyze and address functional mobility deficits, analyze and address ROM deficits, analyze and address soft tissue restrictions, and instruct in home and community integration to address functional deficits and attain remaining goals. Progress toward goals / Updated goals:  []  See Progress Note/Recertification    Goals for this certification period include and are to be achieved in   24  treatments:  Pt will increase FOTO score by 20 points to improve ability to perform ADLs. Status at PN:  Progressin (Status at last note/certification: 27 points total). Reassess at 30 days/next PN. 2. Pt will increase PROM left shoulder flex to 150 degs, ABD to 140 degs, ER to WNL in a neutral plane to improve ability to progress through protocol. Status at PN:  Progressing:  Flex 160 deg with end range discomfort; Abduction full motion with mild scaption near end range; ER (@90 scaption) 86 deg with end range discomfort; IE (@90 scaption) 80 deg with mild end range discomfort (Status at last note/certification: Flexion: 90 degrees, ER @ 45 degrees: 40 degrees, Abd: 60 degrees). Not addressed this visit, will do ROM/stretching at NV. 3. Pt will increase AROM left shoulder flex/ABD to at least 130 degs to improve ease of reaching when cleared to perform AROM. Status at PN:  Progressing:  Flex 137 deg; Ext 53 deg;  Abduction 135 with some movement into scaption near end range; Behind Head Reach to ~T2-3 and right to ~T4-5, left , right by ~1\"; Cross Body Reach ~WFL with finger tips nearly to right middle scapula

## 2023-09-14 ENCOUNTER — HOSPITAL ENCOUNTER (OUTPATIENT)
Facility: HOSPITAL | Age: 68
Setting detail: RECURRING SERIES
Discharge: HOME | End: 2023-09-17
Payer: MEDICARE

## 2023-09-14 PROCEDURE — 97140 MANUAL THERAPY 1/> REGIONS: CPT

## 2023-09-14 PROCEDURE — 97110 THERAPEUTIC EXERCISES: CPT

## 2023-09-14 PROCEDURE — 97112 NEUROMUSCULAR REEDUCATION: CPT

## 2023-09-14 NOTE — PROGRESS NOTES
PHYSICAL / OCCUPATIONAL THERAPY - DAILY TREATMENT NOTE (updated )    Patient Name: Naina Medina    Date: 2023    : 1955  Insurance: Payor: MEDICARE / Plan: MEDICARE PART A AND B / Product Type: *No Product type* /      Patient  verified Yes     Visit #   Current / Total 21 26   Time   In / Out 900 940   Pain   In / Out 0 0   Subjective Functional Status/Changes: No new c/o     TREATMENT AREA =  Shoulder pain, left [M25.512]    OBJECTIVE    Modalities Rationale:     decrease edema, decrease inflammation, decrease pain, and increase tissue extensibility to improve patient's ability to progress to PLOF and address remaining functional goals. min [] Estim Unattended, type/location:                                      []  w/ice    []  w/heat    min [] Estim Attended, type/location:                                     []  w/US     []  w/ice    []  w/heat    []  TENS insruct      min []  Mechanical Traction: type/lbs                   []  pro   []  sup   []  int   []  cont    []  before manual    []  after manual    min []  Ultrasound, settings/location:      min []  Iontophoresis w/ dexamethasone, location:                                               []  take home patch       []  in clinic   nd min  unbill [x]  Ice     []  Heat    location/position: Supine Left reclined Left shoulder  ice post.    min []  Paraffin,  details:     min []  Vasopneumatic Device, press/temp:     min []  Nichol Santost / Martha Serge: If using vaso (only need to measure limb vaso being performed on)      pre-treatment girth :       post-treatment girth :       measured at (landmark location) :      min []  Other:    Skin assessment post-treatment (if applicable):    [x]  intact    []  redness- no adverse reaction                 []redness - adverse reaction:         Therapeutic Procedures:     Tx Min Billable or 1:1 Min (if diff from Tx Min) Procedure, Rationale, Specifics   20  [id number] Therapeutic Exercise (timed):

## 2023-09-18 ENCOUNTER — HOSPITAL ENCOUNTER (OUTPATIENT)
Facility: HOSPITAL | Age: 68
Setting detail: RECURRING SERIES
Discharge: HOME | End: 2023-09-21
Payer: MEDICARE

## 2023-09-18 PROCEDURE — 97110 THERAPEUTIC EXERCISES: CPT

## 2023-09-18 PROCEDURE — 97530 THERAPEUTIC ACTIVITIES: CPT

## 2023-09-18 PROCEDURE — 97112 NEUROMUSCULAR REEDUCATION: CPT

## 2023-09-18 NOTE — PROGRESS NOTES
min = 1 unit; 23-37 min = 2 units; 38-52 min = 3 units; 53-67 min = 4 units; 68-82 min = 5 units   Total Total     [x]  Patient Education billed concurrently with other procedures   [x] Review HEP    [] Progressed/Changed HEP, detail:    [] Other detail:       Objective Information/Functional Measures/Assessment    Patient presenting with good tolerance to increase of therx and demonstrating increase AROM of shoulder. Patient will continue to benefit from skilled PT / OT services to modify and progress therapeutic interventions, analyze and address functional mobility deficits, analyze and address ROM deficits, analyze and address soft tissue restrictions, and instruct in home and community integration to address functional deficits and attain remaining goals. Progress toward goals / Updated goals:  []  See Progress Note/Recertification    Goals for this certification period include and are to be achieved in   24  treatments:  Pt will increase FOTO score by 20 points to improve ability to perform ADLs. Status at PN:  Progressin (Status at last note/certification: 27 points total). Reassess at 30 days/next PN. 2. Pt will increase PROM left shoulder flex to 150 degs, ABD to 140 degs, ER to WNL in a neutral plane to improve ability to progress through protocol. Status at PN:  Progressing:  Flex 160 deg with end range discomfort; Abduction full motion with mild scaption near end range; ER (@90 scaption) 86 deg with end range discomfort; IE (@90 scaption) 80 deg with mild end range discomfort (Status at last note/certification: Flexion: 90 degrees, ER @ 45 degrees: 40 degrees, Abd: 60 degrees). Not addressed this visit, will do ROM/stretching at NV. 3. Pt will increase AROM left shoulder flex/ABD to at least 130 degs to improve ease of reaching when cleared to perform AROM. Status at PN:  Progressing:  Flex 137 deg; Ext 53 deg;  Abduction 135 with some movement into scaption near end range; Behind Head

## 2023-09-20 ENCOUNTER — APPOINTMENT (OUTPATIENT)
Facility: HOSPITAL | Age: 68
End: 2023-09-20
Payer: MEDICARE

## 2023-09-20 ENCOUNTER — HOSPITAL ENCOUNTER (OUTPATIENT)
Facility: HOSPITAL | Age: 68
Setting detail: RECURRING SERIES
Discharge: HOME | End: 2023-09-23
Payer: MEDICARE

## 2023-09-20 PROCEDURE — 97112 NEUROMUSCULAR REEDUCATION: CPT

## 2023-09-20 PROCEDURE — 97110 THERAPEUTIC EXERCISES: CPT

## 2023-09-20 PROCEDURE — 97535 SELF CARE MNGMENT TRAINING: CPT

## 2023-09-20 NOTE — PROGRESS NOTES
PHYSICAL / OCCUPATIONAL THERAPY - DAILY TREATMENT NOTE (updated )    Patient Name: Mary Bruno    Date: 2023    : 1955  Insurance: Payor: MEDICARE / Plan: MEDICARE PART A AND B / Product Type: *No Product type* /      Patient  verified Yes     Visit #   Current / Total 23 26   Time   In / Out 825 910   Pain   In / Out 0 0   Subjective Functional Status/Changes: I haven't had any real pain recently     TREATMENT AREA =  Shoulder pain, left [M25.512]    OBJECTIVE    Modalities Rationale:     decrease edema, decrease inflammation, decrease pain, and increase tissue extensibility to improve patient's ability to progress to PLOF and address remaining functional goals. min [] Estim Unattended, type/location:                                      []  w/ice    []  w/heat    min [] Estim Attended, type/location:                                     []  w/US     []  w/ice    []  w/heat    []  TENS insruct      min []  Mechanical Traction: type/lbs                   []  pro   []  sup   []  int   []  cont    []  before manual    []  after manual    min []  Ultrasound, settings/location:      min []  Iontophoresis w/ dexamethasone, location:                                               []  take home patch       []  in clinic   nd min  unbill [x]  Ice     []  Heat    location/position: Supine Left reclined Left shoulder  ice post.    min []  Paraffin,  details:     min []  Vasopneumatic Device, press/temp:     min []  Mary Robert / Roslyn Martinez: If using vaso (only need to measure limb vaso being performed on)      pre-treatment girth :       post-treatment girth :       measured at (landmark location) :      min []  Other:    Skin assessment post-treatment (if applicable):    [x]  intact    []  redness- no adverse reaction                 []redness - adverse reaction:         Therapeutic Procedures:     Tx Min Billable or 1:1 Min (if diff from Tx Min) Procedure, Rationale, Specifics   15 16 [id number]

## 2023-09-20 NOTE — THERAPY RECERTIFICATION
read the above report and request that my patient be discharged from therapy. Physician's Signature:_________________________   DATE:_________   TIME:________                           Lalo Turpin    ** Signature, Date and Time must be completed for valid certification **  Please sign and fax to [address] [place] [address] [phone].   Thank you

## 2023-09-21 ENCOUNTER — APPOINTMENT (OUTPATIENT)
Facility: HOSPITAL | Age: 68
End: 2023-09-21
Payer: MEDICARE

## 2023-09-25 ENCOUNTER — HOSPITAL ENCOUNTER (OUTPATIENT)
Facility: HOSPITAL | Age: 68
Setting detail: RECURRING SERIES
Discharge: HOME | End: 2023-09-28
Payer: MEDICARE

## 2023-09-25 PROCEDURE — 97140 MANUAL THERAPY 1/> REGIONS: CPT

## 2023-09-25 PROCEDURE — 97110 THERAPEUTIC EXERCISES: CPT

## 2023-09-25 PROCEDURE — 97112 NEUROMUSCULAR REEDUCATION: CPT

## 2023-09-25 NOTE — PROGRESS NOTES
PHYSICAL / OCCUPATIONAL THERAPY - DAILY TREATMENT NOTE (updated )    Patient Name: Sesar Menon    Date: 2023    : 1955  Insurance: Payor: MEDICARE / Plan: MEDICARE PART A AND B / Product Type: *No Product type* /      Patient  verified Yes     Visit #   Current / Total 24 32   Time   In / Out 900 955   Pain   In / Out 0 0   Subjective Functional Status/Changes: I learned my limitations at work. TREATMENT AREA =  Shoulder pain, left [M25.512]    OBJECTIVE    Modalities Rationale:     decrease edema, decrease inflammation, decrease pain, and increase tissue extensibility to improve patient's ability to progress to PLOF and address remaining functional goals. min [] Estim Unattended, type/location:                                      []  w/ice    []  w/heat    min [] Estim Attended, type/location:                                     []  w/US     []  w/ice    []  w/heat    []  TENS insruct      min []  Mechanical Traction: type/lbs                   []  pro   []  sup   []  int   []  cont    []  before manual    []  after manual    min []  Ultrasound, settings/location:      min []  Iontophoresis w/ dexamethasone, location:                                               []  take home patch       []  in clinic   nd min  unbill [x]  Ice     []  Heat    location/position: Supine Left reclined Left shoulder  ice post.    min []  Paraffin,  details:     min []  Vasopneumatic Device, press/temp:     min []  Russel Johnson / Arian Romo: If using vaso (only need to measure limb vaso being performed on)      pre-treatment girth :       post-treatment girth :       measured at (landmark location) :      min []  Other:    Skin assessment post-treatment (if applicable):    [x]  intact    []  redness- no adverse reaction                 []redness - adverse reaction:         Therapeutic Procedures:     Tx Min Billable or 1:1 Min (if diff from Tx Min) Procedure, Rationale, Specifics    37802 Therapeutic

## 2023-09-28 ENCOUNTER — HOSPITAL ENCOUNTER (OUTPATIENT)
Facility: HOSPITAL | Age: 68
Setting detail: RECURRING SERIES
End: 2023-09-28
Payer: MEDICARE

## 2023-09-28 PROCEDURE — 97530 THERAPEUTIC ACTIVITIES: CPT

## 2023-09-28 PROCEDURE — 97112 NEUROMUSCULAR REEDUCATION: CPT

## 2023-09-28 PROCEDURE — 97110 THERAPEUTIC EXERCISES: CPT

## 2023-09-28 NOTE — PROGRESS NOTES
PHYSICAL / OCCUPATIONAL THERAPY - DAILY TREATMENT NOTE (updated )    Patient Name: Maris Wynne    Date: 2023    : 1955  Insurance: Payor: MEDICARE / Plan: MEDICARE PART A AND B / Product Type: *No Product type* /      Patient  verified Yes     Visit #   Current / Total 25 32   Time   In / Out 900 940   Pain   In / Out 0 0   Subjective Functional Status/Changes: I think this gloomy weather bothers my arm. TREATMENT AREA =  Shoulder pain, left [M25.512]    OBJECTIVE    Modalities Rationale:     decrease edema, decrease inflammation, decrease pain, and increase tissue extensibility to improve patient's ability to progress to PLOF and address remaining functional goals. min [] Estim Unattended, type/location:                                      []  w/ice    []  w/heat    min [] Estim Attended, type/location:                                     []  w/US     []  w/ice    []  w/heat    []  TENS insruct      min []  Mechanical Traction: type/lbs                   []  pro   []  sup   []  int   []  cont    []  before manual    []  after manual    min []  Ultrasound, settings/location:      min []  Iontophoresis w/ dexamethasone, location:                                               []  take home patch       []  in clinic   nd min  unbill [x]  Ice     []  Heat    location/position: Supine Left reclined Left shoulder  ice post.    min []  Paraffin,  details:     min []  Vasopneumatic Device, press/temp:     min []  Trell Reason / Joanne : If using vaso (only need to measure limb vaso being performed on)      pre-treatment girth :       post-treatment girth :       measured at (landmark location) :      min []  Other:    Skin assessment post-treatment (if applicable):    [x]  intact    []  redness- no adverse reaction                 []redness - adverse reaction:         Therapeutic Procedures:     Tx Min Billable or 1:1 Min (if diff from Tx Min) Procedure, Rationale, Specifics   28 43 [id number] Therapeutic Exercise (timed):  increase ROM, strength, coordination, balance, and proprioception to improve patient's ability to progress to PLOF and address remaining functional goals. (see flow sheet as applicable)     Details if applicable:         72538 Manual Therapy (timed):  decrease pain, increase ROM, increase tissue extensibility, and decrease edema to improve patient's ability to progress to PLOF and address remaining functional goals. The manual therapy interventions were performed at a separate and distinct time from the therapeutic activities interventions . (see flow sheet as applicable)     Details if applicable:  semi reclined gentle PROM per protocol    10 10  95961 Neuromuscular Re-Education (timed):  improve balance, coordination, kinesthetic sense, posture, core stability and proprioception to improve patient's ability to develop conscious control of individual muscles and awareness of position of extremities in order to progress to PLOF and address remaining functional goals. (see flow sheet as applicable)       Details if applicable:     10 10  55230 Therapeutic Activity (timed):  use of dynamic activities replicating functional movements to increase ROM, strength, coordination, balance, and proprioception in order to improve patient's ability to progress to PLOF and address remaining functional goals. (see flow sheet as applicable)       Details if applicable:        09949 Self Care/Home Management (timed):  improve patient knowledge and understanding of pain reducing techniques, positioning, activity modification, and    to improve patient's ability to progress to PLOF and address remaining functional goals. (see flow sheet as applicable)       Details if applicable:  review post op with model and Netters.    36 [phone] Reminder: bill using total billable min of TIMED therapeutic procedures (example: do not include dry needle or estim unattended, both untimed codes, in totals to

## 2023-10-02 ENCOUNTER — APPOINTMENT (OUTPATIENT)
Facility: HOSPITAL | Age: 68
End: 2023-10-02
Payer: MEDICARE

## 2023-10-05 ENCOUNTER — HOSPITAL ENCOUNTER (OUTPATIENT)
Facility: HOSPITAL | Age: 68
Setting detail: RECURRING SERIES
Discharge: HOME | End: 2023-10-08
Payer: MEDICARE

## 2023-10-05 PROCEDURE — 97140 MANUAL THERAPY 1/> REGIONS: CPT

## 2023-10-05 PROCEDURE — 97530 THERAPEUTIC ACTIVITIES: CPT

## 2023-10-05 PROCEDURE — 97110 THERAPEUTIC EXERCISES: CPT

## 2023-10-05 NOTE — PROGRESS NOTES
10/12/2023  9:00 AM Rosaland Cuff, PTA [facility]   10/17/2023  8:20 AM Rosaland Cuff, PTA [facility]   10/19/2023  8:20 AM Rosaland Cuff, PTA [facility]   10/24/2023  8:20 AM Sorayaland Cuff, PTA [facility]   10/26/2023 10:20 AM Vani Hugo, PT [facility]   10/31/2023  8:20 AM Serenity Burgos, PTA [facility]

## 2023-10-09 ENCOUNTER — HOSPITAL ENCOUNTER (OUTPATIENT)
Facility: HOSPITAL | Age: 68
Setting detail: RECURRING SERIES
Discharge: HOME | End: 2023-10-12
Payer: MEDICARE

## 2023-10-09 PROCEDURE — 97530 THERAPEUTIC ACTIVITIES: CPT | Performed by: PHYSICAL THERAPIST

## 2023-10-09 PROCEDURE — 97110 THERAPEUTIC EXERCISES: CPT | Performed by: PHYSICAL THERAPIST

## 2023-10-09 NOTE — PROGRESS NOTES
PHYSICAL / OCCUPATIONAL THERAPY - DAILY TREATMENT NOTE (updated )    Patient Name: Critsy Marquez    Date: 10/9/2023    : 1955  Insurance: Payor: MEDICARE / Plan: MEDICARE PART A AND B / Product Type: *No Product type* /      Patient  verified Yes     Visit #   Current / Total 27 32   Time   In / Out 900am 950am   Pain   In / Out 0 0   Subjective Functional Status/Changes: Pt reports soreness has subsided from prior incident. TREATMENT AREA =  Shoulder pain, left [M25.512]    OBJECTIVE    Modalities Rationale:     decrease edema, decrease inflammation, decrease pain, and increase tissue extensibility to improve patient's ability to progress to PLOF and address remaining functional goals. min [] Estim Unattended, type/location:                                      []  w/ice    []  w/heat    min [] Estim Attended, type/location:                                     []  w/US     []  w/ice    []  w/heat    []  TENS insruct      min []  Mechanical Traction: type/lbs                   []  pro   []  sup   []  int   []  cont    []  before manual    []  after manual    min []  Ultrasound, settings/location:      min []  Iontophoresis w/ dexamethasone, location:                                               []  take home patch       []  in clinic   PD min  unbill [x]  Ice     []  Heat    location/position: Supine Left reclined Left shoulder  ice post.    min []  Paraffin,  details:     min []  Vasopneumatic Device, press/temp:     min []  Sharion Park / Irasema Laws: If using vaso (only need to measure limb vaso being performed on)      pre-treatment girth :       post-treatment girth :       measured at (landmark location) :      min []  Other:    Skin assessment post-treatment (if applicable):    [x]  intact    []  redness- no adverse reaction                 []redness - adverse reaction:         Therapeutic Procedures:     Tx Min Billable or 1:1 Min (if diff from [employer]) Procedure, Rationale, Specifics

## 2023-10-12 ENCOUNTER — HOSPITAL ENCOUNTER (OUTPATIENT)
Facility: HOSPITAL | Age: 68
Setting detail: RECURRING SERIES
Discharge: HOME | End: 2023-10-15
Payer: MEDICARE

## 2023-10-12 PROCEDURE — 97530 THERAPEUTIC ACTIVITIES: CPT

## 2023-10-12 PROCEDURE — 97112 NEUROMUSCULAR REEDUCATION: CPT

## 2023-10-12 PROCEDURE — 97110 THERAPEUTIC EXERCISES: CPT

## 2023-10-12 NOTE — PROGRESS NOTES
PHYSICAL / OCCUPATIONAL THERAPY - DAILY TREATMENT NOTE (updated )    Patient Name: Vidal Lloyd    Date: 10/12/2023    : 1955  Insurance: Payor: MEDICARE / Plan: MEDICARE PART A AND B / Product Type: *No Product type* /      Patient  verified Yes     Visit #   Current / Total 28 32   Time   In / Out 900 955   Pain   In / Out 0 0   Subjective Functional Status/Changes: My arm actually feels good today unlike the other day     TREATMENT AREA =  Shoulder pain, left [M25.512]    OBJECTIVE    Modalities Rationale:     decrease edema, decrease inflammation, decrease pain, and increase tissue extensibility to improve patient's ability to progress to PLOF and address remaining functional goals. min [] Estim Unattended, type/location:                                      []  w/ice    []  w/heat    min [] Estim Attended, type/location:                                     []  w/US     []  w/ice    []  w/heat    []  TENS insruct      min []  Mechanical Traction: type/lbs                   []  pro   []  sup   []  int   []  cont    []  before manual    []  after manual    min []  Ultrasound, settings/location:      min []  Iontophoresis w/ dexamethasone, location:                                               []  take home patch       []  in clinic   nd min  unbill [x]  Ice     []  Heat    location/position: Supine Left reclined Left shoulder  ice post.    min []  Paraffin,  details:     min []  Vasopneumatic Device, press/temp:     min []  Chu Gravely / Coy [place]: If using vaso (only need to measure limb vaso being performed on)      pre-treatment girth :       post-treatment girth :       measured at (landmark location) :      min []  Other:    Skin assessment post-treatment (if applicable):    [x]  intact    []  redness- no adverse reaction                 []redness - adverse reaction:         Therapeutic Procedures:     Tx Min Billable or 1:1 Min (if diff from [employer]) Procedure, Rationale, Specifics   25

## 2023-10-17 ENCOUNTER — HOSPITAL ENCOUNTER (OUTPATIENT)
Facility: HOSPITAL | Age: 68
Setting detail: RECURRING SERIES
Discharge: HOME | End: 2023-10-20
Payer: MEDICARE

## 2023-10-17 PROCEDURE — 97530 THERAPEUTIC ACTIVITIES: CPT

## 2023-10-17 PROCEDURE — 97110 THERAPEUTIC EXERCISES: CPT

## 2023-10-17 NOTE — PROGRESS NOTES
PHYSICAL / OCCUPATIONAL THERAPY - DAILY TREATMENT NOTE (updated )    Patient Name: Liam Green    Date: 10/17/2023    : 1955  Insurance: Payor: MEDICARE / Plan: MEDICARE PART A AND B / Product Type: *No Product type* /      Patient  verified Yes     Visit #   Current / Total 29 32   Time   In / Out 825 905   Pain   In / Out 0 0   Subjective Functional Status/Changes: I really feel like my arm is much better and stronger, but it's not 100%. Probably like 90% improved     TREATMENT AREA =  Shoulder pain, left [M25.512]    OBJECTIVE    Modalities Rationale:     decrease edema, decrease inflammation, decrease pain, and increase tissue extensibility to improve patient's ability to progress to PLOF and address remaining functional goals. min [] Estim Unattended, type/location:                                      []  w/ice    []  w/heat    min [] Estim Attended, type/location:                                     []  w/US     []  w/ice    []  w/heat    []  TENS insruct      min []  Mechanical Traction: type/lbs                   []  pro   []  sup   []  int   []  cont    []  before manual    []  after manual    min []  Ultrasound, settings/location:      min []  Iontophoresis w/ dexamethasone, location:                                               []  take home patch       []  in clinic   nd min  unbill [x]  Ice     []  Heat    location/position: Supine Left reclined Left shoulder  ice post.    min []  Paraffin,  details:     min []  Vasopneumatic Device, press/temp:     min []  Velvet Cross / Claudia Elliott: If using vaso (only need to measure limb vaso being performed on)      pre-treatment girth :       post-treatment girth :       measured at (landmark location) :      min []  Other:    Skin assessment post-treatment (if applicable):    [x]  intact    []  redness- no adverse reaction                 []redness - adverse reaction:         Therapeutic Procedures:     Tx Min Billable or 1:1 Min (if diff

## 2023-10-19 ENCOUNTER — HOSPITAL ENCOUNTER (OUTPATIENT)
Facility: HOSPITAL | Age: 68
Setting detail: RECURRING SERIES
Discharge: HOME | End: 2023-10-22
Payer: MEDICARE

## 2023-10-19 PROCEDURE — 97140 MANUAL THERAPY 1/> REGIONS: CPT

## 2023-10-19 PROCEDURE — 97110 THERAPEUTIC EXERCISES: CPT

## 2023-10-19 PROCEDURE — 97535 SELF CARE MNGMENT TRAINING: CPT

## 2023-10-19 NOTE — PROGRESS NOTES
PHYSICAL / OCCUPATIONAL THERAPY - DAILY TREATMENT NOTE (updated )    Patient Name: Rocio Prieto    Date: 10/19/2023    : 1955  Insurance: Payor: MEDICARE / Plan: MEDICARE PART A AND B / Product Type: *No Product type* /      Patient  verified Yes     Visit #   Current / Total 30 32   Time   In / Out 825 905   Pain   In / Out 0 0   Subjective Functional Status/Changes: No new c/o     TREATMENT AREA =  Shoulder pain, left [M25.512]    OBJECTIVE    Therapeutic Procedures: Tx Min Billable or 1:1 Min (if diff from Tx Min) Procedure, Rationale, Specifics   15 15 [id number] Therapeutic Exercise (timed):  increase ROM, strength, coordination, balance, and proprioception to improve patient's ability to progress to PLOF and address remaining functional goals. (see flow sheet as applicable)     Details if applicable:         74623 Self Care/Home Management (timed):  improve patient knowledge and understanding of pain reducing techniques, positioning, activity modification, and    to improve patient's ability to progress to PLOF and address remaining functional goals. (see flow sheet as applicable)       Details if applicable:  review post op with model and Netters. 36 36 [facility] Totals Reminder: bill using total billable min of TIMED therapeutic procedures (example: do not include dry needle or estim unattended, both untimed codes, in totals to left)  8-22 min = 1 unit; 23-37 min = 2 units; 38-52 min = 3 units; 53-67 min = 4 units; 68-82 min = 5 units   Total Total     [x]  Patient Education billed concurrently with other procedures   [x] Review HEP    [] Progressed/Changed HEP, detail:    [] Other detail:       Objective Information/Functional Measures/Assessment    GROC: +7 a very great deal better    FOTO: 74     Patient reports ~90% improvement with performance of all general ADLs and functional mobility activities.      AROM (L) shoulder:   Flexion: 145  Scaption: 140  ALEJANDRA: T1 with full ER  FIR: T4

## 2023-10-19 NOTE — THERAPY DISCHARGE
[address]  [address] [facility], [address], [place], [address] Ph: [phone] Fx: [phone]  DISCHARGE SUMMARY FOR PHYSICAL THERAPY          Patient Name: Tammy Jameson : 1955   Treatment/Medical Diagnosis: Shoulder pain, left [M25.512]   Onset Date: DOS 2023    Referral Source: Deondre Contreras [facility]): 2023   Prior Hospitalization: See Medical History Provider #: [id number]   Prior Level of Function: Independent with ADLs, work, and functional tasks with pain in the left shoulder prior to surgery. Comorbidities: Right THR , left TKR , right TKR , arthritis, LBP, HTN, depression   Medications: Verified on Patient Summary List   Visits from [facility]: 30 Missed Visits: 0     Key Functional Changes/Progress:      Patient had very good tolerance to all therapeutic interventions and was able to return to PLOF with minimal limitations. Patient lifting with (L) UE with heavy weights continued to be limited, but was able to use UE to perform light general ADLs. GROC: +7 a very great deal better     FOTO: 74 vs 27 on initial eval: 29 point overall improvement in function     Patient reports ~90% improvement with performance of all general ADLs and functional mobility activities. AROM (L) shoulder:   Flexion: 145  Scaption: 140  ALEJANDRA: T1 with full ER  FIR: T4     MMT strength: 5/5 scaption, 5/5 er and IR, 4+/5 flexion    Patient able to lift, transfer, and carry 20 lb box x 10 without shoulder pain. Current Status: Patient is able to lift, carry and transfer 20# box with use of (B) UE: mild discomfort in (L) UE. Goal Met? Progressing well    2. Patient able to lift and carry 8 lb with left hand/arm without pain or difficulty. Current Status: Patient is able to lift and carry 8# with (L) UE: mild discomfort in UE  Goal Met? Progressing well    3.  Pt will increase AROM left shoulder flex/ABD to at least 130
 used

## 2023-10-24 ENCOUNTER — APPOINTMENT (OUTPATIENT)
Facility: HOSPITAL | Age: 68
End: 2023-10-24
Payer: MEDICARE

## 2023-10-26 ENCOUNTER — APPOINTMENT (OUTPATIENT)
Facility: HOSPITAL | Age: 68
End: 2023-10-26
Payer: MEDICARE

## 2023-10-31 ENCOUNTER — APPOINTMENT (OUTPATIENT)
Facility: HOSPITAL | Age: 68
End: 2023-10-31
Payer: MEDICARE

## 2023-11-15 ENCOUNTER — HOSPITAL ENCOUNTER (OUTPATIENT)
Facility: HOSPITAL | Age: 68
Setting detail: RECURRING SERIES
Discharge: HOME | End: 2023-11-18
Payer: MEDICARE

## 2023-11-15 PROCEDURE — 97162 PT EVAL MOD COMPLEX 30 MIN: CPT

## 2023-11-15 NOTE — PROGRESS NOTES
PHYSICAL / OCCUPATIONAL THERAPY - DAILY TREATMENT NOTE (updated )    Patient Name: Jose Armstrong    Date: 11/15/2023    : 1955  Insurance: Payor: MEDICARE / Plan: MEDICARE PART A AND B / Product Type: *No Product type* /      Patient  verified Yes     Visit #   Current / Total 1 16   Time   In / Out 1:46 2:33   Pain   In / Out 0/10 0.5 to 1/10   Subjective Functional Status/Changes: See Eval/POC. TREATMENT AREA =  Sciatica, right side [M54.31]  Sciatica, left side [M54.32]    OBJECTIVE    47 min [x]Eval - untimed                      Therapeutic Procedures: Tx Min Billable or 1:1 Min (if diff from [employer]) Procedure, Rationale, Specifics               Details if applicable:            Details if applicable:            Details if applicable:            Details if applicable:     0 0 [facility] Totals Reminder: bill using total billable min of TIMED therapeutic procedures (example: do not include dry needle or estim unattended, both untimed codes, in totals to left)  8-22 min = 1 unit; 23-37 min = 2 units; 38-52 min = 3 units; 53-67 min = 4 units; 68-82 min = 5 units   Total Total     [x]  Patient Education billed concurrently with other procedures   [x] Review HEP    [] Progressed/Changed HEP, detail:    [] Other detail:       Objective Information/Functional Measures/Assessment    See Eval/POC. Patient will continue to benefit from skilled PT / OT services to modify and progress therapeutic interventions, analyze and address functional mobility deficits, analyze and address ROM deficits, analyze and address strength deficits, analyze and address soft tissue restrictions, analyze and cue for proper movement patterns, analyze and modify for postural abnormalities, analyze and address imbalance/dizziness, and instruct in home and community integration to address functional deficits and attain remaining goals.     Progress toward goals / Updated goals:  []  See Progress Note/Recertification    See

## 2023-11-15 NOTE — THERAPY EVALUATION
[facility]  [address] [facility], [address], [place], [address] Ph: [phone] Fx: [phone]  Plan of Care / Statement of Necessity for Physical Therapy Services     Patient Name: Charan Ziegler : 1955   Medical   Diagnosis: Sciatica, right side [M54.31]  Sciatica, left side [M54.32] Treatment Diagnosis:  M54.41  LUMBAGO WITH SCIATICA, RIGHT SIDE and M54.42  LUMBAGO WITH SCIATICA, LEFT SIDE    Onset Date: ~2022     Referral Source: Radha Arauz [place] [facility]): 11/15/2023   Prior Hospitalization: See medical history Provider #: [id number]   Prior Level of Function: Off/on LBP with \"weak\" back many years; left sciatica for several months last year/earlier this year with onset related to sitting and bending tasks; active and independent with ADLs, work, home chores, gardening, pet care. Comorbidities: Right THR ; Right TKR ; Left TKR ; Arthritis; LBP; HTN; Depression; Left RCR 2023. Assessment / key information:  Patient is a 76year old right handed female referred to PT by her primary care PA doctor (2023) for lumbago with left and right sciatica. Patient recently seen in this clinic for PT (30 visits, 23 to 10/19/23) for s/p left RC repair. Today in PT, patient reports onset of central/bilateral L-S area pain with radiation into left buttock, posterolateral thigh, lateral leg, and dorsal foot beginning about 2022 without known cause. Pt does relate history of 2 falls last year--2022 slipped and feel onto her bottom without known injury and 2022 (post-onset of LBP/left sciatica) with caught foot and fell onto her back with large box on top of her and again, no known injury.   Symptoms would have rapid onset after getting into a car (passenger's or 's side) and be very bad by the time she drove home from work (standing), about 2.5 miles, then better once up and moving again; better on

## 2023-11-17 ENCOUNTER — HOSPITAL ENCOUNTER (OUTPATIENT)
Facility: HOSPITAL | Age: 68
Setting detail: RECURRING SERIES
Discharge: HOME | End: 2023-11-20
Payer: MEDICARE

## 2023-11-17 PROCEDURE — 97110 THERAPEUTIC EXERCISES: CPT

## 2023-11-17 PROCEDURE — 97535 SELF CARE MNGMENT TRAINING: CPT

## 2023-11-17 PROCEDURE — 97112 NEUROMUSCULAR REEDUCATION: CPT

## 2023-11-17 NOTE — PROGRESS NOTES
PHYSICAL / OCCUPATIONAL THERAPY - DAILY TREATMENT NOTE (updated )    Patient Name: Mariella Dominique    Date: 2023    : 1955  Insurance: Payor: MEDICARE / Plan: MEDICARE PART A AND B / Product Type: *No Product type* /      Patient  verified Yes     Visit #   Current / Total 2 16   Time   In / Out 745 825   Pain   In / Out 1 1   Subjective Functional Status/Changes: Denies any radicular symptoms (B) LE.      TREATMENT AREA =  Lumbago with sciatica, right side [M54.41]  Lumbago with sciatica, left side [M54.42]    OBJECTIVE    Modalities Rationale:     decrease inflammation, decrease pain, and increase tissue extensibility to improve patient's ability to progress to PLOF and address remaining functional goals. min [] Estim Unattended, type/location:                                      []  w/ice    []  w/heat    min [] Estim Attended, type/location:                                     []  w/US     []  w/ice    []  w/heat    []  TENS insruct      min []  Mechanical Traction: type/lbs                   []  pro   []  sup   []  int   []  cont    []  before manual    []  after manual    min []  Ultrasound, settings/location:      min  unbill []  Ice     []  Heat    location/position:     min []  Paraffin,  details:     min []  Vasopneumatic Device, press/temp:     min []  Brett Nail / Clorinda Pata: If using vaso (only need to measure limb vaso being performed on)      pre-treatment girth :       post-treatment girth :       measured at (landmark location) :      min []  Other:    Skin assessment post-treatment:   Intact     Therapeutic Procedures: Tx Min Billable or 1:1 Min (if diff from Tx Min) Procedure, Rationale, Specifics     89863 Manual Therapy (timed):  decrease pain, increase ROM, and increase tissue extensibility to improve patient's ability to progress to PLOF and address remaining functional goals.   The manual therapy interventions were performed at a separate and distinct time from the

## 2023-11-21 ENCOUNTER — HOSPITAL ENCOUNTER (OUTPATIENT)
Facility: HOSPITAL | Age: 68
Setting detail: RECURRING SERIES
Discharge: HOME | End: 2023-11-24
Payer: MEDICARE

## 2023-11-21 PROCEDURE — 97110 THERAPEUTIC EXERCISES: CPT

## 2023-11-21 PROCEDURE — 97140 MANUAL THERAPY 1/> REGIONS: CPT

## 2023-11-21 PROCEDURE — 97535 SELF CARE MNGMENT TRAINING: CPT

## 2023-11-21 NOTE — PROGRESS NOTES
performed at a separate and distinct time from the therapeutic activities interventions . (see flow sheet as applicable)     Details if applicable:  prone with one pillow: (B) hip: STM piriformis and release with ER motion. Grade 1 PA jeff L/s.     15 15  14595 Therapeutic Exercise (timed):  increase ROM, strength, coordination, balance, and proprioception to improve patient's ability to progress to PLOF and address remaining functional goals. (see flow sheet as applicable)       Details if applicable:        75343 Neuromuscular Re-Education (timed):  improve balance, coordination, kinesthetic sense, posture, core stability and proprioception to improve patient's ability to develop conscious control of individual muscles and awareness of position of extremities in order to progress to PLOF and address remaining functional goals. (see flow sheet as applicable)       Details if applicable:        28054 Therapeutic Activity (timed):  use of dynamic activities replicating functional movements to increase ROM, strength, coordination, balance, and proprioception in order to improve patient's ability to progress to PLOF and address remaining functional goals. (see flow sheet as applicable)       Details if applicable:     10 10  22156 Self Care/Home Management (timed):  improve patient knowledge and understanding of pain reducing techniques, positioning, and diagnosis/prognosis  to improve patient's ability to progress to PLOF and address remaining functional goals. (see flow sheet as applicable)       Details if applicable:  education performed on L/S anatomy for patient's present diagnosis and symptoms.    4101 Katherine Cui Reminder: bill using total billable min of TIMED therapeutic procedures (example: do not include dry needle or estim unattended, both untimed codes, in totals to left)  8-22 min = 1 unit; 23-37 min = 2 units; 38-52 min = 3 units; 53-67 min = 4 units; 68-82 min = 5 units   Total Total     [x]

## 2023-11-24 ENCOUNTER — HOSPITAL ENCOUNTER (OUTPATIENT)
Facility: HOSPITAL | Age: 68
Setting detail: RECURRING SERIES
Discharge: HOME | End: 2023-11-27
Payer: MEDICARE

## 2023-11-24 PROCEDURE — 97112 NEUROMUSCULAR REEDUCATION: CPT

## 2023-11-24 PROCEDURE — 97110 THERAPEUTIC EXERCISES: CPT

## 2023-11-24 NOTE — PROGRESS NOTES
from 0/10 up to 10/10.  0/10 pre-Tx and </= 1/10 post-Tx. Increase PROM for bilat hip ER to >/= 75-80 deg. Status at IE:  left ~65 deg and right ~75 deg. Pt performed well with seated KEV/Piriformis stretches. Increase bilat HS flexibility to >/= -10 deg. Status at IE:  left about -20 deg and right about -10 to -15 deg. Pt performed well with standing HS stretches. Long Term Goals: To be accomplished in 6-8 weeks  Improve FOTO score to >/= 63. Status at IE:  62.   Reassess at 30 days/PN. 2. No tenderness to palpation. Status at IE:  tender just lateral to left sacral border in gluteal muscles. Not Reassessed/addressed this vist, but pt did well with ROM/stretching activities. 3. Increase trunk extension AROM in standing to decreased by </= 25%; bilat rotations decreased by </= 25%. Status at IE:  Ext decreased by ~75%; Rotation decreased by ~50% bilat. Pt did well with all trunk ROM/stretching exercises. 4. Increase bilat trunk SB AROM to reaches finger tips to level of inferior patellar poles without discomfort. Status at IE:  SB right to inferior patellar pole and left to mid patella, both with some L-S discomfort. Pt did well with all trunk ROM/stretching exercises. 5. Patient able to utilize principles of good posture/body mechanics and techniques of core stabilization for ADLs,, chores, transitions. Status at IE:  Untrained. Good core stabilization and form with LE movements in standing and supine.      Next PN/ RC due 12/14/2023  Auth due 12/31/2023    PLAN  - Continue Plan of Care  - Upgrade activities as tolerated    Adolfo Ibrahim PT    11/24/2023    6:48 AM    Future Appointments   Date Time Provider [address]   11/24/2023  7:40 AM Adolfo Ibrahim PT [facility]   11/27/2023  8:20 AM Eagle Huitron PTA [facility]   11/30/2023  1:00 PM Jesus Bowie [facility]   12/4/2023

## 2023-11-27 ENCOUNTER — HOSPITAL ENCOUNTER (OUTPATIENT)
Facility: HOSPITAL | Age: 68
Setting detail: RECURRING SERIES
Discharge: HOME | End: 2023-11-30
Payer: MEDICARE

## 2023-11-27 PROCEDURE — 97110 THERAPEUTIC EXERCISES: CPT

## 2023-11-27 PROCEDURE — 97112 NEUROMUSCULAR REEDUCATION: CPT

## 2023-11-27 NOTE — PROGRESS NOTES
PHYSICAL / OCCUPATIONAL THERAPY - DAILY TREATMENT NOTE (updated )    Patient Name: Live Valenzuela    Date: 2023    : 1955  Insurance: Payor: MEDICARE / Plan: MEDICARE PART A AND B / Product Type: *No Product type* /      Patient  verified Yes     Visit #   Current / Total 4 16   Time   In / Out 825 905   Pain   In / Out 1 1   Subjective Functional Status/Changes: Low grade fracture in L5 and degeneration     TREATMENT AREA =  Lumbago with sciatica, right side [M54.41]  Lumbago with sciatica, left side [M54.42]    OBJECTIVE    Modalities Rationale:     decrease inflammation, decrease pain, and increase tissue extensibility to improve patient's ability to progress to PLOF and address remaining functional goals. min [] Estim Unattended, type/location:                                      []  w/ice    []  w/heat    min [] Estim Attended, type/location:                                     []  w/US     []  w/ice    []  w/heat    []  TENS insruct      min []  Mechanical Traction: type/lbs                   []  pro   []  sup   []  int   []  cont    []  before manual    []  after manual    min []  Ultrasound, settings/location:      min  unbill []  Ice     []  Heat    location/position:     min []  Paraffin,  details:     min []  Vasopneumatic Device, press/temp:     min []  James Weber / Matthew Necessary: If using vaso (only need to measure limb vaso being performed on)      pre-treatment girth :       post-treatment girth :       measured at (landmark location) :      min []  Other:    Skin assessment post-treatment:   Intact     Therapeutic Procedures: Tx Min Billable or 1:1 Min (if diff from Tx Min) Procedure, Rationale, Specifics     89842 Manual Therapy (timed):  decrease pain, increase ROM, and increase tissue extensibility to improve patient's ability to progress to PLOF and address remaining functional goals.   The manual therapy interventions were performed at a separate and distinct time from

## 2023-11-30 ENCOUNTER — HOSPITAL ENCOUNTER (OUTPATIENT)
Facility: HOSPITAL | Age: 68
Setting detail: RECURRING SERIES
End: 2023-11-30
Payer: MEDICARE

## 2023-11-30 PROCEDURE — 97112 NEUROMUSCULAR REEDUCATION: CPT

## 2023-11-30 PROCEDURE — 97110 THERAPEUTIC EXERCISES: CPT

## 2023-11-30 NOTE — PROGRESS NOTES
PHYSICAL / OCCUPATIONAL THERAPY - DAILY TREATMENT NOTE (updated )    Patient Name: Dennise Republic    Date: 2023    : 1955  Insurance: Payor: MEDICARE / Plan: MEDICARE PART A AND B / Product Type: *No Product type* /      Patient  verified Yes     Visit #   Current / Total 5 16   Time   In / Out 115 155   Pain   In / Out 0 1   Subjective Functional Status/Changes: I was in a lot of pain this morning. But it went away with a few stretches. TREATMENT AREA =  Lumbago with sciatica, right side [M54.41]  Lumbago with sciatica, left side [M54.42]    OBJECTIVE    Modalities Rationale:     decrease inflammation, decrease pain, and increase tissue extensibility to improve patient's ability to progress to PLOF and address remaining functional goals. min [] Estim Unattended, type/location:                                      []  w/ice    []  w/heat    min [] Estim Attended, type/location:                                     []  w/US     []  w/ice    []  w/heat    []  TENS insruct      min []  Mechanical Traction: type/lbs                   []  pro   []  sup   []  int   []  cont    []  before manual    []  after manual    min []  Ultrasound, settings/location:      min  unbill []  Ice     []  Heat    location/position:     min []  Paraffin,  details:     min []  Vasopneumatic Device, press/temp:     min []  Loree Diaz / Ruth Mcfadden: If using vaso (only need to measure limb vaso being performed on)      pre-treatment girth :       post-treatment girth :       measured at (landmark location) :      min []  Other:    Skin assessment post-treatment:   Intact     Therapeutic Procedures: Tx Min Billable or 1:1 Min (if diff from Tx Min) Procedure, Rationale, Specifics     92671 Manual Therapy (timed):  decrease pain, increase ROM, and increase tissue extensibility to improve patient's ability to progress to PLOF and address remaining functional goals.   The manual therapy interventions were performed at

## 2023-12-04 ENCOUNTER — HOSPITAL ENCOUNTER (OUTPATIENT)
Facility: HOSPITAL | Age: 68
Setting detail: RECURRING SERIES
Discharge: HOME | End: 2023-12-07
Payer: MEDICARE

## 2023-12-04 PROCEDURE — 97112 NEUROMUSCULAR REEDUCATION: CPT

## 2023-12-04 PROCEDURE — 97110 THERAPEUTIC EXERCISES: CPT

## 2023-12-04 NOTE — PROGRESS NOTES
Patient Education billed concurrently with other procedures   [x] Review HEP    [] Progressed/Changed HEP, detail:    [] Other detail:       Objective Information/Functional Measures/Assessment    Patient received HEP for core strengthening and stability which was reviewed. Patient verbalized understanding and returned demonstration well    Patient will continue to benefit from skilled PT / OT services to modify and progress therapeutic interventions, analyze and address functional mobility deficits, analyze and address ROM deficits, analyze and address strength deficits, analyze and address soft tissue restrictions, analyze and cue for proper movement patterns, analyze and modify for postural abnormalities, analyze and address imbalance/dizziness, and instruct in home and community integration to address functional deficits and attain remaining goals. Progress toward goals / Updated goals:  []  See Progress Note/Recertification    Short Term Goals: To be accomplished in 3-4 weeks  Patient independent and compliant with progressive HEP/Self-Care Routine. Status at IE:  No HEP  Received new HEP. Decrease max pain scale rating to </= 2-3/10. Status at IE:  ranges from 0/10 up to 10/10. Continues to report low grade pain levels. Increase PROM for bilat hip ER to >/= 75-80 deg. Status at IE:  left ~65 deg and right ~75 deg. Pt performed well with seated KEV/Piriformis stretches. Increase bilat HS flexibility to >/= -10 deg. Status at IE:  left about -20 deg and right about -10 to -15 deg. Pt performed well with standing HS stretches. Long Term Goals: To be accomplished in 6-8 weeks  Improve FOTO score to >/= 63. Status at IE:  62.              Reassess at 30 days/PN. 2. No tenderness to palpation. Status at IE:  tender just lateral to left sacral border in gluteal muscles.               Not Reassessed/addressed

## 2023-12-07 ENCOUNTER — HOSPITAL ENCOUNTER (OUTPATIENT)
Facility: HOSPITAL | Age: 68
Setting detail: RECURRING SERIES
Discharge: HOME | End: 2023-12-10
Payer: MEDICARE

## 2023-12-07 PROCEDURE — 97140 MANUAL THERAPY 1/> REGIONS: CPT

## 2023-12-07 PROCEDURE — 97110 THERAPEUTIC EXERCISES: CPT

## 2023-12-07 PROCEDURE — 97535 SELF CARE MNGMENT TRAINING: CPT

## 2023-12-07 PROCEDURE — 97112 NEUROMUSCULAR REEDUCATION: CPT

## 2023-12-07 NOTE — PROGRESS NOTES
PHYSICAL / OCCUPATIONAL THERAPY - DAILY TREATMENT NOTE (updated )    Patient Name: Sarai Pedersen    Date: 2023    : 1955  Insurance: Payor: MEDICARE / Plan: MEDICARE PART A AND B / Product Type: *No Product type* /      Patient  verified Yes     Visit #   Current / Total 7 16   Time   In / Out 825 925   Pain   In / Out 0 0   Subjective Functional Status/Changes: I woke up and had pain in my back and in my butt (B) gluteal fold region, but it's gone now. TREATMENT AREA =  Lumbago with sciatica, right side [M54.41]  Lumbago with sciatica, left side [M54.42]    OBJECTIVE    Modalities Rationale:     decrease inflammation, decrease pain, and increase tissue extensibility to improve patient's ability to progress to PLOF and address remaining functional goals. min [] Estim Unattended, type/location:                                      []  w/ice    []  w/heat    min [] Estim Attended, type/location:                                     []  w/US     []  w/ice    []  w/heat    []  TENS insruct      min []  Mechanical Traction: type/lbs                   []  pro   []  sup   []  int   []  cont    []  before manual    []  after manual    min []  Ultrasound, settings/location:      min  unbill []  Ice     []  Heat    location/position:     min []  Paraffin,  details:     min []  Vasopneumatic Device, press/temp:     min []  Starleen Matias / Verlon Brightly: If using vaso (only need to measure limb vaso being performed on)      pre-treatment girth :       post-treatment girth :       measured at (landmark location) :      min []  Other:    Skin assessment post-treatment:   Intact     Therapeutic Procedures: Tx Min Billable or 1:1 Min (if diff from Tx Min) Procedure, Rationale, Specifics   10 10 [id number] Manual Therapy (timed):  decrease pain, increase ROM, and increase tissue extensibility to improve patient's ability to progress to PLOF and address remaining functional goals.   The manual therapy interventions

## 2023-12-11 ENCOUNTER — HOSPITAL ENCOUNTER (OUTPATIENT)
Facility: HOSPITAL | Age: 68
Setting detail: RECURRING SERIES
Discharge: HOME | End: 2023-12-14
Payer: MEDICARE

## 2023-12-11 PROCEDURE — 97530 THERAPEUTIC ACTIVITIES: CPT

## 2023-12-11 PROCEDURE — 97110 THERAPEUTIC EXERCISES: CPT

## 2023-12-11 NOTE — PROGRESS NOTES
PHYSICAL / OCCUPATIONAL THERAPY - DAILY TREATMENT NOTE (updated )    Patient Name: Brandan Mayer    Date: 2023    : 1955  Insurance: Payor: MEDICARE / Plan: MEDICARE PART A AND B / Product Type: *No Product type* /      Patient  verified Yes     Visit #   Current / Total 8 16   Time   In / Out 825 905   Pain   In / Out 3 1   Subjective Functional Status/Changes: I used the pillow with sleeping and sitting and it works good. I don't have a lot of pain right now. TREATMENT AREA =  Lumbago with sciatica, right side [M54.41]  Lumbago with sciatica, left side [M54.42]    OBJECTIVE    Modalities Rationale:     decrease inflammation, decrease pain, and increase tissue extensibility to improve patient's ability to progress to PLOF and address remaining functional goals. min [] Estim Unattended, type/location:                                      []  w/ice    []  w/heat    min [] Estim Attended, type/location:                                     []  w/US     []  w/ice    []  w/heat    []  TENS insruct      min []  Mechanical Traction: type/lbs                   []  pro   []  sup   []  int   []  cont    []  before manual    []  after manual    min []  Ultrasound, settings/location:      min  unbill []  Ice     []  Heat    location/position:     min []  Paraffin,  details:     min []  Vasopneumatic Device, press/temp:     min []  Mae Peter / Murlene Rumple: If using vaso (only need to measure limb vaso being performed on)      pre-treatment girth :       post-treatment girth :       measured at (landmark location) :      min []  Other:    Skin assessment post-treatment:   Intact     Therapeutic Procedures: Tx Min Billable or 1:1 Min (if diff from Tx Min) Procedure, Rationale, Specifics     85827 Manual Therapy (timed):  decrease pain, increase ROM, and increase tissue extensibility to improve patient's ability to progress to PLOF and address remaining functional goals.   The manual therapy

## 2023-12-13 ENCOUNTER — HOSPITAL ENCOUNTER (OUTPATIENT)
Facility: HOSPITAL | Age: 68
Setting detail: RECURRING SERIES
Discharge: HOME | End: 2023-12-16
Payer: MEDICARE

## 2023-12-13 PROCEDURE — 97530 THERAPEUTIC ACTIVITIES: CPT

## 2023-12-13 PROCEDURE — 97535 SELF CARE MNGMENT TRAINING: CPT

## 2023-12-13 NOTE — THERAPY DISCHARGE
[facility] [email or web address] PHYSICAL THERAPY  [address] [place] [address], [address] [place], [place] [address]Kit quinn, [address] Ph: [phone] Fx: [phone]  DISCHARGE SUMMARY FOR PHYSICAL THERAPY          Patient Name: Edvin Whitehead : 1955   Treatment/Medical Diagnosis: Lumbago with sciatica, left side [M54.42]  Lumbago with sciatica, right side [M54.41]   Onset Date: ~2022     Referral Source: Ryland Thomas [place] [facility] 11/15/2023    Prior Hospitalization: See Medical History Provider #: [id number]   Prior Level of Function: Off/on LBP with \"weak\" back many years; left sciatica for several months last year/earlier this year with onset related to sitting and bending tasks; active and independent with ADLs, work, home chores, gardening, pet care. Comorbidities: Right THR ; Right TKR ; Left TKR ; Arthritis; LBP; HTN; Depression; Left RCR 2023. Medications: Verified on Patient Summary List   Vis[facility] from [facility]: *** Missed Visits: ***     Key Functional Changes/Progress: ***    Improve FOTO score to >/= 63. Status at IE:  62. Current Status: ***  Goal Met? {Yes/No-Ex:910554}    2. No tenderness to palpation. Status at IE:  tender just lateral to left sacral border in gluteal muscles. Current Status: ***  Goal Met? {Yes/No-Ex:430349}    3. Increase trunk extension AROM in standing to decreased by </= 25%; bilat rotations decreased by </= 25%. Status at IE:  Ext decreased by ~75%; Rotation decreased by ~50% bilat. Current Status: ***  Goal Met? {Yes/No-Ex:331781}    4. Increase bilat trunk SB AROM to reaches finger tips to level of inferior patellar poles without discomfort. Status at IE:  SB right to inferior patellar pole and left to mid patella, both with some L-S discomfort. Current Status: ***  Goal Met? {Yes/No-Ex:721702}    5.  Patient able to utilize principles of good posture/body mechanics and techniques of

## 2023-12-13 NOTE — PROGRESS NOTES
Measures/Assessment    Performed back education leading to lifting, carrying and [id number]# box and 26# box. Patient demonstrating good carryover with back support throughout activity. GROC: +7 a great deal better     FOTO: 69     Patient reports average pain 1/10     Patient reports ~ 95% reduction of symptoms. Presently reports only stiffness of knee     Patient reports ~ 100% improvement with performance of all functional mobility activities. Patient reports walking tolerance no problems, standing tolerance no problems. Patient is able to negotiate on flight of stairs with reciprocal pattern and no handrails    PROM for bilat hip ER to >/= 75-80 deg. Status at IE:  left ~80 deg and right ~80 deg. Bilat HS flexibility -8 deg and right -10 deg. Ext ~75 %; Rotation WNL bilat. Bilat trunk SB AROM to reaches finger tips to level of inferior patellar poles without discomfort. Maintaining = pelvis. Patient will continue to benefit from skilled PT / OT services to modify and progress therapeutic interventions, analyze and address functional mobility deficits, analyze and address ROM deficits, analyze and address strength deficits, analyze and address soft tissue restrictions, analyze and cue for proper movement patterns, analyze and modify for postural abnormalities, analyze and address imbalance/dizziness, and instruct in home and community integration to address functional deficits and attain remaining goals. Progress toward goals / Updated goals:  []  See Progress Note/Recertification    Short Term Goals: To be accomplished in 3-4 weeks  Patient independent and compliant with progressive HEP/Self-Care Routine. Status at IE:  No HEP  Received new HEP. 12/4/23  Decrease max pain scale rating to </= 2-3/10. Status at IE:  ranges from 0/10 up to 10/10.   Progressing well with overall reported pain at 0-1/10. 12/7/23  Increase PROM for

## 2023-12-18 ENCOUNTER — APPOINTMENT (OUTPATIENT)
Facility: HOSPITAL | Age: 68
End: 2023-12-18
Payer: MEDICARE

## 2023-12-21 ENCOUNTER — APPOINTMENT (OUTPATIENT)
Facility: HOSPITAL | Age: 68
End: 2023-12-21
Payer: MEDICARE

## 2023-12-27 ENCOUNTER — APPOINTMENT (OUTPATIENT)
Facility: HOSPITAL | Age: 68
End: 2023-12-27
Payer: MEDICARE

## 2023-12-29 ENCOUNTER — APPOINTMENT (OUTPATIENT)
Facility: HOSPITAL | Age: 68
End: 2023-12-29
Payer: MEDICARE

## 2025-07-29 ENCOUNTER — HOSPITAL ENCOUNTER (OUTPATIENT)
Facility: HOSPITAL | Age: 70
Setting detail: RECURRING SERIES
Discharge: HOME | End: 2025-08-01
Payer: MEDICARE

## 2025-07-29 PROCEDURE — 97161 PT EVAL LOW COMPLEX 20 MIN: CPT

## 2025-07-29 NOTE — PROGRESS NOTES
posture/ergonomics, home safety, and activity modification  to improve patient's ability to progress to PLOF and address remaining functional goals.  (see flow sheet as applicable)     Details if applicable:       x x  BC Totals Reminder: bill using total billable min of TIMED therapeutic procedures (example: do not include dry needle or estim unattended, both untimed codes, in totals to left)  8-22 min = 1 unit; 23-37 min = 2 units; 38-52 min = 3 units; 53-67 min = 4 units; 68-82 min = 5 units   Total Total     [x]  Patient Education billed concurrently with other procedures     Functional Measures/Assessment:    No deficitt noted in l/s AROM     Strength:  Manual Muscle Testing: Right Left   Hip Flexion 3+/5 3+/5   Knee Extension 4-/5 4-/5   Knee Flexion 4-/5 4/5   Hip Abduction 2+/5 3-/5   Hip Adduction 4-/5 4-/5   Hip Extension 2+/5 2+/5   Hip External Rotation 3-/5 3-/5   Hip Internal Rotation 3-/5 3-/5       Palpation: tender to palpation Glute and low l/s  paraspinal       Neural Screen Right Left   Slump - --   SLR - -      SI Screen Right Left   KEV - -   Lateral Compression - -     Flexibility Right Left   90/90 Hamstring + +   Ely + +   Rufus + +   Cait - -     Repeated Spinal Movement Testing:  discomfort with repeated extension     Functional Bridge: decreased post chain recruitment     Functional Squat: shallow with decreased hip hinge     5X  STS: 15 sec from 18\" seat without BUE support     Oswestry Score: 36 points    Patient will continue to benefit from skilled PT / OT services to modify and progress therapeutic interventions, analyze and address functional mobility deficits, analyze and address strength deficits, analyze and address soft tissue restrictions, analyze and cue for proper movement patterns, analyze and modify for postural abnormalities, and instruct in home and community integration to address functional deficits and attain remaining goals.    Progress toward goals / Updated

## 2025-08-08 ENCOUNTER — HOSPITAL ENCOUNTER (OUTPATIENT)
Facility: HOSPITAL | Age: 70
Setting detail: RECURRING SERIES
Discharge: HOME | End: 2025-08-11
Payer: MEDICARE

## 2025-08-08 PROCEDURE — 97140 MANUAL THERAPY 1/> REGIONS: CPT

## 2025-08-08 PROCEDURE — 97535 SELF CARE MNGMENT TRAINING: CPT

## 2025-08-08 PROCEDURE — 97112 NEUROMUSCULAR REEDUCATION: CPT

## 2025-08-08 PROCEDURE — G0283 ELEC STIM OTHER THAN WOUND: HCPCS

## 2025-08-11 ENCOUNTER — HOSPITAL ENCOUNTER (OUTPATIENT)
Facility: HOSPITAL | Age: 70
Setting detail: RECURRING SERIES
Discharge: HOME | End: 2025-08-14
Payer: MEDICARE

## 2025-08-11 PROCEDURE — 97140 MANUAL THERAPY 1/> REGIONS: CPT

## 2025-08-11 PROCEDURE — G0283 ELEC STIM OTHER THAN WOUND: HCPCS

## 2025-08-11 PROCEDURE — 97110 THERAPEUTIC EXERCISES: CPT

## 2025-08-11 PROCEDURE — 97112 NEUROMUSCULAR REEDUCATION: CPT

## 2025-08-13 ENCOUNTER — HOSPITAL ENCOUNTER (OUTPATIENT)
Facility: HOSPITAL | Age: 70
Setting detail: RECURRING SERIES
Discharge: HOME | End: 2025-08-16
Payer: MEDICARE

## 2025-08-13 PROCEDURE — G0283 ELEC STIM OTHER THAN WOUND: HCPCS

## 2025-08-13 PROCEDURE — 97140 MANUAL THERAPY 1/> REGIONS: CPT

## 2025-08-13 PROCEDURE — 97112 NEUROMUSCULAR REEDUCATION: CPT

## 2025-08-13 PROCEDURE — 97110 THERAPEUTIC EXERCISES: CPT

## 2025-08-15 ENCOUNTER — APPOINTMENT (OUTPATIENT)
Facility: HOSPITAL | Age: 70
End: 2025-08-15
Payer: MEDICARE

## 2025-08-19 ENCOUNTER — HOSPITAL ENCOUNTER (OUTPATIENT)
Facility: HOSPITAL | Age: 70
Setting detail: RECURRING SERIES
Discharge: HOME | End: 2025-08-22
Payer: MEDICARE

## 2025-08-19 PROCEDURE — 97110 THERAPEUTIC EXERCISES: CPT

## 2025-08-19 PROCEDURE — 97140 MANUAL THERAPY 1/> REGIONS: CPT

## 2025-08-19 PROCEDURE — 97112 NEUROMUSCULAR REEDUCATION: CPT

## 2025-08-19 PROCEDURE — 97530 THERAPEUTIC ACTIVITIES: CPT

## 2025-08-21 ENCOUNTER — HOSPITAL ENCOUNTER (OUTPATIENT)
Facility: HOSPITAL | Age: 70
Setting detail: RECURRING SERIES
Discharge: HOME | End: 2025-08-24
Payer: MEDICARE

## 2025-08-21 PROCEDURE — 97530 THERAPEUTIC ACTIVITIES: CPT

## 2025-08-21 PROCEDURE — 97112 NEUROMUSCULAR REEDUCATION: CPT

## 2025-08-21 PROCEDURE — 97110 THERAPEUTIC EXERCISES: CPT

## 2025-08-26 ENCOUNTER — HOSPITAL ENCOUNTER (OUTPATIENT)
Facility: HOSPITAL | Age: 70
Setting detail: RECURRING SERIES
Discharge: HOME | End: 2025-08-29
Payer: MEDICARE

## 2025-08-26 PROCEDURE — 97110 THERAPEUTIC EXERCISES: CPT

## 2025-08-26 PROCEDURE — 97530 THERAPEUTIC ACTIVITIES: CPT

## 2025-08-26 PROCEDURE — 97112 NEUROMUSCULAR REEDUCATION: CPT

## 2025-08-28 ENCOUNTER — HOSPITAL ENCOUNTER (OUTPATIENT)
Facility: HOSPITAL | Age: 70
Setting detail: RECURRING SERIES
End: 2025-08-28
Payer: MEDICARE

## 2025-09-03 ENCOUNTER — HOSPITAL ENCOUNTER (OUTPATIENT)
Facility: HOSPITAL | Age: 70
Setting detail: RECURRING SERIES
Discharge: HOME | End: 2025-09-06
Payer: MEDICARE

## 2025-09-03 PROCEDURE — 97530 THERAPEUTIC ACTIVITIES: CPT

## 2025-09-03 PROCEDURE — 97535 SELF CARE MNGMENT TRAINING: CPT
